# Patient Record
Sex: FEMALE | Race: WHITE | NOT HISPANIC OR LATINO | Employment: FULL TIME | URBAN - METROPOLITAN AREA
[De-identification: names, ages, dates, MRNs, and addresses within clinical notes are randomized per-mention and may not be internally consistent; named-entity substitution may affect disease eponyms.]

---

## 2017-01-06 ENCOUNTER — RADIANT APPOINTMENT (OUTPATIENT)
Dept: GENERAL RADIOLOGY | Facility: CLINIC | Age: 37
End: 2017-01-06
Attending: PHYSICIAN ASSISTANT
Payer: COMMERCIAL

## 2017-01-06 ENCOUNTER — OFFICE VISIT (OUTPATIENT)
Dept: PEDIATRICS | Facility: CLINIC | Age: 37
End: 2017-01-06
Payer: COMMERCIAL

## 2017-01-06 VITALS
HEART RATE: 90 BPM | DIASTOLIC BLOOD PRESSURE: 70 MMHG | TEMPERATURE: 98.8 F | WEIGHT: 214.8 LBS | BODY MASS INDEX: 39.53 KG/M2 | OXYGEN SATURATION: 100 % | SYSTOLIC BLOOD PRESSURE: 98 MMHG | HEIGHT: 62 IN

## 2017-01-06 DIAGNOSIS — J02.9 ACUTE PHARYNGITIS, UNSPECIFIED ETIOLOGY: ICD-10-CM

## 2017-01-06 DIAGNOSIS — J45.901 EXACERBATION OF ASTHMA: Primary | ICD-10-CM

## 2017-01-06 DIAGNOSIS — R05.9 COUGH: ICD-10-CM

## 2017-01-06 LAB
DEPRECATED S PYO AG THROAT QL EIA: NORMAL
MICRO REPORT STATUS: NORMAL
SPECIMEN SOURCE: NORMAL

## 2017-01-06 PROCEDURE — 99214 OFFICE O/P EST MOD 30 MIN: CPT | Performed by: PHYSICIAN ASSISTANT

## 2017-01-06 PROCEDURE — 87081 CULTURE SCREEN ONLY: CPT | Performed by: PHYSICIAN ASSISTANT

## 2017-01-06 PROCEDURE — 71020 XR CHEST 2 VW: CPT

## 2017-01-06 PROCEDURE — 87880 STREP A ASSAY W/OPTIC: CPT | Performed by: PHYSICIAN ASSISTANT

## 2017-01-06 RX ORDER — LORATADINE 10 MG/1
10 TABLET ORAL DAILY
Qty: 30 TABLET | Refills: 1 | COMMUNITY
Start: 2017-01-06 | End: 2023-01-06

## 2017-01-06 RX ORDER — ALBUTEROL SULFATE 90 UG/1
2 AEROSOL, METERED RESPIRATORY (INHALATION) EVERY 6 HOURS PRN
Qty: 1 INHALER | Refills: 0 | Status: SHIPPED | OUTPATIENT
Start: 2017-01-06 | End: 2017-04-28

## 2017-01-06 NOTE — PATIENT INSTRUCTIONS
Call with worsening symptoms                      Acute Bronchitis                  What is acute bronchitis?   Bronchitis is an infection of the air passages--that is, the tubes that connect the windpipe to the lungs. It causes swelling and irritation of the airways. With acute bronchitis you usually have a cough that produces phlegm and pain behind the breastbone when you breathe deeply or cough.   How does it occur?   Bronchitis often occurs with viral infections of the respiratory tract, such as colds and flu. Bronchitis may also be caused by bacterial infections. It may occur with childhood illnesses such as measles and whooping cough.   Attacks are most frequent during the winter or when the level of air pollution is high.   Infants, young children, older adults, smokers, and people with heart disease or lung disease (including asthma and allergies) are most likely to get acute bronchitis.   What are the symptoms?   Symptoms may include:   a deep cough that produces yellowish or greenish phlegm   pain behind the breastbone when you breathe deeply or cough   wheezing   feeling short of breath   fever   chills   headache   sore muscles.   How is it diagnosed?   Your healthcare provider will ask about your symptoms and examine you. You may have tests, such as:   a test of phlegm to look for bacteria   chest X-ray   blood tests.   How is it treated?   Acute bronchitis often does not require medical treatment. Resting at home and drinking plenty of fluids to keep the mucus loose may be all you need to do to get better in a few days. If your symptoms are severe or you have other health problems (such as heart or lung disease or diabetes), you may need to take antibiotics.   How long will the effects last?   Most of the time acute bronchitis clears up in a few days. Your cough may slowly get better in 1 to 2 weeks.   It may take you longer to recover if:   You are a smoker.   You live in an area where air pollution  is a problem.   You have a heart or lung disease.   You have any other continuing health problems.   How can I take care of myself?   You can help yourself by:   following the full treatment your healthcare provider recommends   using a vaporizer, humidifier, or steam from hot water to add moisture to the air   drinking plenty of liquids   taking cough medicine if recommended by your healthcare provider   resting in bed   taking aspirin or acetaminophen to reduce fever and relieve headache and muscle pain (Check with your healthcare provider before you give any medicine that contains aspirin or salicylates to a child or teen. This includes medicines like baby aspirin, some cold medicines, and Pepto Bismol. Children and teens who take aspirin are at risk for a serious illness called Reye's syndrome.)   eating healthy meals.   Call your healthcare provider if:   You have trouble breathing.   You have a fever of 101.5?F (38.6?C) or higher.   You cough up blood.   Your symptoms are getting worse instead of better.   You don't start to feel better after 3 days of treatment.   You have any symptoms that concern you.   How can I help prevent acute bronchitis?   To reduce your risk of getting a respiratory infection:   Do not smoke.   Wash your hands often, especially when you are around people with colds (upper respiratory infections).   If you have asthma or allergies, keep your symptoms under good control.   Get regular exercise.   Eat healthy foods.       Published by [employer].  This content is reviewed periodically and is subject to change as new health information becomes available. The information is intended to inform and educate and is not a replacement for medical evaluation, advice, diagnosis or treatment by a healthcare professional.   Developed by [employer].   ? 2010 [employer] and/or its affiliates. All Rights Reserved.   Copyright   [employer] 2011

## 2017-01-06 NOTE — PROGRESS NOTES
"  SUBJECTIVE:                                                    Micheline Camargo is a 36 year old female who presents to clinic today for the following health issues:    Acute Illness   Acute illness concerns: cough  Onset: 10 days ago     Fever: YES- 100    Chills/Sweats: YES    Headache (location?): YES    Sinus Pressure:YES    Conjunctivitis:  no    Ear Pain: YES- pressure    Rhinorrhea: YES- in the middle    Congestion: YES- in the middle    Sore Throat: YES     Cough: YES-productive of clear sputum, productive of yellow sputum    Wheeze: YES    Worse at night    Decreased Appetite: YES    Nausea: YES    Vomiting: no    Diarrhea:  no    Dysuria/Freq.: no    Fatigue/Achiness: YES    Sick/Strep Exposure: YES     Therapies Tried and outcome: nyquil, claritin, sudafed  History of asthma, allergies.   Nonsmoker.   Work: office  Problem list, Medication list, Allergies, and Medical/Social/Surgical histories reviewed in [facility] and updated as appropriate.    ROS:  ROS otherwise negative    OBJECTIVE:                                                    BP 98/70 mmHg  Pulse 90  Temp(Src) 98.8  F (37.1  C) (Oral)  Ht 5' 1.5\" (1.562 m)  Wt 214 lb 12.8 oz (97.433 kg)  BMI 39.93 kg/m2  SpO2 100%  Body mass index is 39.93 kg/(m^2).   GENERAL: alert, no distress  HENT: ear canals- normal; TMs- normal; Nose- normal; Mouth- no ulcers, no lesions; no sinus tenderness   NECK: no tenderness, no adenopathy  RESP:diminished breath sounds; prolonged expirations - no rales, no rhonchi, no wheezes  CV: regular rates and rhythm, normal S1 S2, no S3 or S4 and no murmur, no click or rub    Diagnostic test results:  Results for orders placed or performed in visit on 01/06/17 (from the past 24 hour(s))   Strep, Rapid Screen   Result Value Ref Range    Specimen Description Throat     Rapid Strep A Screen       NEGATIVE: No Group A streptococcal antigen detected by immunoassay, await   culture report.      Micro Report Status FINAL " 01/06/2017         ASSESSMENT/PLAN:                                                    (J45.901) Exacerbation of asthma  (primary encounter diagnosis)  Comment: begin albuterol up to four times daily PRN. Call if symptoms persist or worsen.  Plan: XR Chest 2 Views, albuterol (PROAIR         HFA/PROVENTIL HFA/VENTOLIN HFA) 108 (90 BASE)         MCG/ACT Inhaler            (J02.9) Acute pharyngitis, unspecified etiology  Comment:   Plan: Strep, Rapid Screen            See Patient Instructions    Colt Loaiza PA-C  [facility]

## 2017-01-06 NOTE — NURSING NOTE
"Chief Complaint   Patient presents with     URI       Initial BP 98/70 mmHg  Pulse 90  Temp(Src) 98.8  F (37.1  C) (Oral)  Ht 5' 1.5\" (1.562 m)  Wt 214 lb 12.8 oz (97.433 kg)  BMI 39.93 kg/m2  SpO2 100% Estimated body mass index is 39.93 kg/(m^2) as calculated from the following:    Height as of this encounter: 5' 1.5\" (1.562 m).    Weight as of this encounter: 214 lb 12.8 oz (97.433 kg).  BP completed using cuff size: regular    "

## 2017-01-06 NOTE — MR AVS SNAPSHOT
After Visit Summary   1/6/2017    Micheline Camargo    MRN: [id number]           Patient Information     Date Of Birth          1980        Visit Information        Provider Department      1/6/2017 3:10 PM Colt Loaiza PA-C [facility]        Today's Diagnoses     Exacerbation of asthma    -  1     Acute pharyngitis, unspecified etiology           Care Instructions    Call with worsening symptoms                      Acute Bronchitis                  What is acute bronchitis?   Bronchitis is an infection of the air passages--that is, the tubes that connect the windpipe to the lungs. It causes swelling and irritation of the airways. With acute bronchitis you usually have a cough that produces phlegm and pain behind the breastbone when you breathe deeply or cough.   How does it occur?   Bronchitis often occurs with viral infections of the respiratory tract, such as colds and flu. Bronchitis may also be caused by bacterial infections. It may occur with childhood illnesses such as measles and whooping cough.   Attacks are most frequent during the winter or when the level of air pollution is high.   Infants, young children, older adults, smokers, and people with heart disease or lung disease (including asthma and allergies) are most likely to get acute bronchitis.   What are the symptoms?   Symptoms may include:   a deep cough that produces yellowish or greenish phlegm   pain behind the breastbone when you breathe deeply or cough   wheezing   feeling short of breath   fever   chills   headache   sore muscles.   How is it diagnosed?   Your healthcare provider will ask about your symptoms and examine you. You may have tests, such as:   a test of phlegm to look for bacteria   chest X-ray   blood tests.   How is it treated?   Acute bronchitis often does not require medical treatment. Resting at home and drinking plenty of fluids to keep the mucus loose may be all you need to do to  get better in a few days. If your symptoms are severe or you have other health problems (such as heart or lung disease or diabetes), you may need to take antibiotics.   How long will the effects last?   Most of the time acute bronchitis clears up in a few days. Your cough may slowly get better in 1 to 2 weeks.   It may take you longer to recover if:   You are a smoker.   You live in an area where air pollution is a problem.   You have a heart or lung disease.   You have any other continuing health problems.   How can I take care of myself?   You can help yourself by:   following the full treatment your healthcare provider recommends   using a vaporizer, humidifier, or steam from hot water to add moisture to the air   drinking plenty of liquids   taking cough medicine if recommended by your healthcare provider   resting in bed   taking aspirin or acetaminophen to reduce fever and relieve headache and muscle pain (Check with your healthcare provider before you give any medicine that contains aspirin or salicylates to a child or teen. This includes medicines like baby aspirin, some cold medicines, and Pepto Bismol. Children and teens who take aspirin are at risk for a serious illness called Reye's syndrome.)   eating healthy meals.   Call your healthcare provider if:   You have trouble breathing.   You have a fever of 101.5?F (38.6?C) or higher.   You cough up blood.   Your symptoms are getting worse instead of better.   You don't start to feel better after 3 days of treatment.   You have any symptoms that concern you.   How can I help prevent acute bronchitis?   To reduce your risk of getting a respiratory infection:   Do not smoke.   Wash your hands often, especially when you are around people with colds (upper respiratory infections).   If you have asthma or allergies, keep your symptoms under good control.   Get regular exercise.   Eat healthy foods.       Published by [employer].  This content is reviewed  "periodically and is subject to change as new health information becomes available. The information is intended to inform and educate and is not a replacement for medical evaluation, advice, diagnosis or treatment by a healthcare professional.   Developed by [employer].   ? 2010 [employer] and/or its affiliates. All Rights Reserved.   Copyright   Clinical [employer] 2011                Follow-ups after your visit        Who to contact     If you have questions or need follow up information about today's clinic visit or your schedule please contact [facility] directly at [phone].  Normal or non-critical lab and imaging results will be communicated to you by [employer], letter or phone within 4 business days after the clinic has received the results. If you do not hear from us within 7 days, please contact the clinic through [employer] or phone. If you have a critical or abnormal lab result, we will notify you by phone as soon as possible.  Submit refill requests through rollApp or call your pharmacy and they will forward the refill request to us. Please allow 3 business days for your refill to be completed.          Additional Information About Your Visit        [employer] Information     rollApp gives you secure access to your electronic health record. If you see a primary care provider, you can also send messages to your care team and make appointments. If you have questions, please call your primary care clinic.  If you do not have a primary care provider, please call [phone] and they will assist you.        Care EveryWhere ID     This is your Care EveryWhere ID. This could be used by other organizations to access your [place] medical records  [id number]        Your Vitals Were     Pulse Temperature Height BMI (Body Mass Index) Pulse Oximetry       90 98.8  F (37.1  C) (Oral) 5' 1.5\" (1.562 m) 39.93 kg/m2 100%        Blood Pressure from Last 3 Encounters:   01/06/17 98/70   12/21/15 118/80 "   06/17/15 100/76    Weight from Last 3 Encounters:   01/06/17 214 lb 12.8 oz (97.433 kg)   12/21/15 212 lb 1.6 oz (96.208 kg)   06/17/15 214 lb (97.07 kg)              We Performed the Following     Strep, Rapid Screen          Today's Medication Changes          These changes are accurate as of: 1/6/17  4:08 PM.  If you have any questions, ask your nurse or doctor.               Start taking these medicines.        Dose/Directions    albuterol 108 (90 BASE) MCG/ACT Inhaler   Commonly known as:  PROAIR HFA/PROVENTIL HFA/VENTOLIN HFA   Used for:  Exacerbation of asthma   Started by:  Colt Loaiza PA-C        Dose:  2 puff   Inhale 2 puffs into the lungs every 6 hours as needed for shortness of breath / dyspnea or wheezing   Quantity:  1 Inhaler   Refills:  0         Stop taking these medicines if you haven't already. Please contact your care team if you have questions.     predniSONE 20 MG tablet   Commonly known as:  DELTASONE   Stopped by:  Colt Loaiza PA-C           PseudoePHEDrine-guaiFENesin 120-1200 MG Tb12   Commonly known as:  MUCINEX D   Stopped by:  Colt Loaiza PA-C                Where to get your medicines      These medications were sent to [facility] - [address]   [address]  [address], [place] [address]     Phone:  [phone]    - albuterol 108 (90 BASE) MCG/ACT Inhaler             Primary Care Provider Office Phone # Fax #    Alex Mantilla [phone]       [facility] [address] [place] [address] [address] [place] [address]        Thank you!     Thank you for choosing [facility]  for your care. Our goal is always to provide you with excellent care. Hearing back from our patients is one way we can continue to improve our services. Please take a few minutes to complete the written survey that you may receive in the mail after your visit with us. Thank you!             Your Updated  Medication List - Protect others around you: Learn how to safely use, store and throw away your medicines at www.disposemymeds.org.          This list is accurate as of: 1/6/17  4:08 PM.  Always use your most recent med list.                   Brand Name Dispense Instructions for use    albuterol 108 (90 BASE) MCG/ACT Inhaler    PROAIR HFA/PROVENTIL HFA/VENTOLIN HFA    1 Inhaler    Inhale 2 puffs into the lungs every 6 hours as needed for shortness of breath / dyspnea or wheezing       CLARITIN 10 MG tablet   Generic drug:  loratadine     30 tablet    Take 1 tablet (10 mg) by mouth daily

## 2017-01-08 LAB
BACTERIA SPEC CULT: NORMAL
MICRO REPORT STATUS: NORMAL
SPECIMEN SOURCE: NORMAL

## 2017-02-27 PROBLEM — J45.901 EXACERBATION OF ASTHMA: Status: ACTIVE | Noted: 2017-02-27

## 2017-04-28 ENCOUNTER — OFFICE VISIT (OUTPATIENT)
Dept: PEDIATRICS | Facility: CLINIC | Age: 37
End: 2017-04-28
Payer: COMMERCIAL

## 2017-04-28 VITALS
SYSTOLIC BLOOD PRESSURE: 105 MMHG | DIASTOLIC BLOOD PRESSURE: 63 MMHG | TEMPERATURE: 97.7 F | BODY MASS INDEX: 38.03 KG/M2 | WEIGHT: 204.6 LBS | HEART RATE: 83 BPM

## 2017-04-28 DIAGNOSIS — Z97.5 IUD (INTRAUTERINE DEVICE) IN PLACE: ICD-10-CM

## 2017-04-28 DIAGNOSIS — J45.901 EXACERBATION OF ASTHMA: ICD-10-CM

## 2017-04-28 DIAGNOSIS — Z00.00 ROUTINE GENERAL MEDICAL EXAMINATION AT A HEALTH CARE FACILITY: Primary | ICD-10-CM

## 2017-04-28 DIAGNOSIS — D23.5 BENIGN NEOPLASM OF SKIN OF TRUNK, EXCEPT SCROTUM: ICD-10-CM

## 2017-04-28 DIAGNOSIS — Z71.1 CONCERN ABOUT FEMALE BREAST DISEASE WITHOUT DIAGNOSIS: ICD-10-CM

## 2017-04-28 PROCEDURE — 99395 PREV VISIT EST AGE 18-39: CPT | Performed by: NURSE PRACTITIONER

## 2017-04-28 PROCEDURE — G0145 SCR C/V CYTO,THINLAYER,RESCR: HCPCS | Performed by: NURSE PRACTITIONER

## 2017-04-28 PROCEDURE — 87624 HPV HI-RISK TYP POOLED RSLT: CPT | Performed by: NURSE PRACTITIONER

## 2017-04-28 RX ORDER — ALBUTEROL SULFATE 90 UG/1
2 AEROSOL, METERED RESPIRATORY (INHALATION) EVERY 6 HOURS PRN
Qty: 1 INHALER | Refills: 3 | Status: SHIPPED | OUTPATIENT
Start: 2017-04-28 | End: 2019-03-15

## 2017-04-28 NOTE — PROGRESS NOTES
SUBJECTIVE:     CC: Micheline Camargo is an 36 year old woman who presents for preventive health visit.     Physical   Annual:     Getting at least 3 servings of Calcium per day::  Yes    Bi-annual eye exam::  Yes    Dental care twice a year::  Yes    Sleep apnea or symptoms of sleep apnea::  None    Diet::  Regular (no restrictions) and Carbohydrate counting    Frequency of exercise::  4-5 days/week    Duration of exercise::  30-45 minutes    Taking medications regularly::  Yes    Medication side effects::  None    Additional concerns today::  YES    Had Mirena IUD placed approx 2 years ago. Normally has light bleeding about every 4 months, but about 2 weeks ago she had a few days of spotting. She has since stopped bleeding.Feels like she can't feel her IUD strings. Denies pain. She is sexually active, but has had scant bleeding.     Has concerns about mild breast tenderness, especially on the right. This pain fluctuates throughout the month. Had breast US done at outside facility about 5 years ago that showed dense breast tissue. No fam hx of breast cancer.    Fair skin and multiple moles. Concerned about darker colored mole to right anterior shin. She has skin checks every 6 months per derm.    Today's PHQ-2 Score:   PHQ-2 ( 1999 Pfizer) 4/28/2017   Q1: Little interest or pleasure in doing things -   Q2: Feeling down, depressed or hopeless -   PHQ-2 Score -   Little interest or pleasure in doing things Not at all   Feeling down, depressed or hopeless Not at all   PHQ-2 Score 0     Abuse: Current or Past(Physical, Sexual or Emotional)- No  Do you feel safe in your environment - Yes    Social History   Substance Use Topics     Smoking status: Never Smoker     Smokeless tobacco: Never Used     Alcohol use 1.0 oz/week     2 Glasses of wine per week     The patient does not drink >3 drinks per day nor >7 drinks per week.    Recent Labs   Lab Test  06/25/14   0816  08/10/11   1004   CHOL  125  139   HDL  43*  44*    LDL  67  76   TRIG  79  93   CHOLHDLRATIO  2.9  3.2       Reviewed orders with patient.  Reviewed health maintenance and updated orders accordingly - Yes    Mammo Decision Support:  Mammo discussed, not appropriate for or declined by this patient.    Pertinent mammograms are reviewed under the imaging tab.  History of abnormal Pap smear: NO - age 30-65 PAP every 5 years with negative HPV co-testing recommended    Reviewed and updated as needed this visit by clinical staff  Tobacco  Med Hx  Surg Hx  Fam Hx  Soc Hx      Reviewed and updated as needed this visit by Provider        ROS:  C: NEGATIVE for fever, chills, change in weight  INTEGUMENTARY/SKIN: POSITIVE for multiple moles and fair skin.  E: NEGATIVE for vision changes or irritation  ENT: NEGATIVE for ear, mouth and throat problems  R: NEGATIVE for significant cough or SOB  BREAST: POSITIVE for Hx fibrocystic changes and intermittent right breast pain, NEGATIVE for breast skin changes, nipple pain, or nipple discharge.  CV: NEGATIVE for chest pain, palpitations or peripheral edema  GI: NEGATIVE for nausea, abdominal pain, heartburn, or change in bowel habits   female: POSITIVE for light spotting with IUD x1, denies menstrual cramps or abdominal pain. See above.  M: NEGATIVE for significant arthralgias or myalgia  N: NEGATIVE for weakness, dizziness or paresthesias  P: NEGATIVE for changes in mood or affect  Problem list, Medication list, Allergies, and Medical/Social/Surgical histories reviewed in EPIC and updated as appropriate.    OBJECTIVE:     /63  Pulse 83  Temp 97.7  F (36.5  C) (Tympanic)  Wt 204 lb 9.6 oz (92.8 kg)  BMI 38.03 kg/m2  EXAM:  GENERAL: healthy, alert and no distress  EYES: Eyes grossly normal to inspection, PERRL and conjunctivae and sclerae normal  HENT: ear canals and TM's normal, nose and mouth without ulcers or lesions  NECK: no adenopathy, no asymmetry, masses, or scars and thyroid normal to palpation  RESP: lungs  clear to auscultation - no rales, rhonchi or wheezes  BREAST: normal without masses,  or nipple discharge and no palpable axillary masses or adenopathy. Mild tenderness under right axilla.  CV: regular rate and rhythm, normal S1 S2, no S3 or S4, no murmur, click or rub, no peripheral edema and peripheral pulses strong  ABDOMEN: soft, nontender, no hepatosplenomegaly, no masses and bowel sounds normal   (female): normal female external genitalia, normal urethral meatus, vaginal mucosa pink, moist, well rugated, and normal cervix/adnexa/uterus without masses or discharge. IUD strings short but visualized from os after minimal manipulation with cervical brush.  MS: no gross musculoskeletal defects noted, no edema  SKIN: no suspicious lesions or rashes, fair skin, multiple moles to upper back, shoulders, arms, and lower legs. X1 pinpoint dark brown lesion to right shin.  NEURO: Normal strength and tone, mentation intact and speech normal  PSYCH: mentation appears normal, affect normal/bright  LYMPH: no cervical, supraclavicular, or axillary adenopathy    ASSESSMENT/PLAN:     1. Routine general medical examination at a health care facility  Pt overweight. Discussed healthy diet and exercise, she plans to exercise more this summer and follow low carb diet. She has had normal lipid panel done in the past 1 year.  - Asthma Action Plan (AAP)  - Pap imaged thin layer screen with HPV - recommended age 30 - 65 years (select HPV order below)  - HPV High Risk Types DNA Cervical    2. Exacerbation of asthma  Well controlled at this time.  - Asthma Action Plan (AAP)  - albuterol (PROAIR HFA/PROVENTIL HFA/VENTOLIN HFA) 108 (90 BASE) MCG/ACT Inhaler; Inhale 2 puffs into the lungs every 6 hours as needed for shortness of breath / dyspnea or wheezing  Dispense: 1 Inhaler; Refill: 3    3. IUD (intrauterine device) in place  She was concerned about IUD placement. Able to visualize strings after minimal manipulation with cervical brush,  "strings appear short. No concern for displacement or perforation and no indication for US at this time. Pt comfortable with this plan.    4. Benign neoplasm of skin of trunk, except scrotum  Pt has multiple moles to body and fair skin. Continue with every 6 month mole checks per derm. Her area of concern to her right anterior shin appears to be benign on exam.    5. Concern about female breast disease without diagnosis  Pt reports hx of fibrocystic breast changes on US done at an outside facility. Breast exam today normal. No fam hx of breast cancer. Discussed recommendations for mammograms and breast imaging and she feels comfortable waiting until age 40 to start mammograms since no increased risk. Discussed that fibrocystic breast changes do not put her at increased risk for breast cancer.      COUNSELING:  Reviewed preventive health counseling, as reflected in patient instructions       Regular exercise       Healthy diet/nutrition       Contraception       Safe sex practices/STD prevention     reports that she has never smoked. She has never used smokeless tobacco.    Estimated body mass index is 38.03 kg/(m^2) as calculated from the following:    Height as of 1/6/17: 5' 1.5\" (1.562 m).    Weight as of this encounter: 204 lb 9.6 oz (92.8 kg).   Weight management plan: Discussed healthy diet and exercise guidelines and patient will follow up in 12 months in clinic to re-evaluate.    Counseling Resources:  ATP IV Guidelines  Pooled Cohorts Equation Calculator  Breast Cancer Risk Calculator  FRAX Risk Assessment  ICSI Preventive Guidelines  Dietary Guidelines for Americans, 2010  USDA's MyPlate  ASA Prophylaxis  Lung CA Screening    Ceci Alexander, LORI Romero [facility]  "

## 2017-04-28 NOTE — NURSING NOTE
"Chief Complaint   Patient presents with     Physical       Initial /63  Pulse 83  Temp 97.7  F (36.5  C) (Tympanic)  Wt 204 lb 9.6 oz (92.8 kg)  BMI 38.03 kg/m2 Estimated body mass index is 38.03 kg/(m^2) as calculated from the following:    Height as of 1/6/17: 5' 1.5\" (1.562 m).    Weight as of this encounter: 204 lb 9.6 oz (92.8 kg).  Medication Reconciliation: complete    "

## 2017-04-28 NOTE — LETTER
My Asthma Action Plan  Name: Micheline Camargo   YOB: 1980  Date: 4/28/2017   My doctor: LORI Perez CNP   My clinic: [facility]        My Control Medicine: na  My Rescue Medicine: Albuterol (Proair/Ventolin/Proventil) inhaler 1-2 puffs every 4-6hrs as needed   My Asthma Severity: intermittent  Avoid your asthma triggers: smoke, upper respiratory infections, dust mites, pollens, animal dander, insects/rodents, mold, humidity, aspirin, strong odors and fumes, occupational exposure, exercise or sports, emotions, cold air, Gastric Reflux and Patient is unaware of triggers               GREEN ZONE     Good Control    I feel good    No cough or wheeze    Can work, sleep and play without asthma symptoms       Take your asthma control medicine every day.     1. If exercise triggers your asthma, take your rescue medication    15 minutes before exercise or sports, and    During exercise if you have asthma symptoms  2. Spacer to use with inhaler: If you have a spacer, make sure to use it with your inhaler             YELLOW ZONE     Getting Worse  I have ANY of these:    I do not feel good    Cough or wheeze    Chest feels tight    Wake up at night   1. Keep taking your Green Zone medications  2. Start taking your rescue medicine:    every 20 minutes for up to 1 hour. Then every 4 hours for 24-48 hours.  3. If you stay in the Yellow Zone for more than 12-24 hours, contact your doctor.  4. If you do not return to the Green Zone in 12-24 hours or you get worse, start taking your oral steroid medicine if prescribed by your provider.           RED ZONE     Medical Alert - Get Help  I have ANY of these:    I feel awful    Medicine is not helping    Breathing getting harder    Trouble walking or talking    Nose opens wide to breathe       1. Take your rescue medicine NOW  2. If your provider has prescribed an oral steroid medicine, start taking it NOW  3. Call your doctor NOW  4. If you  are still in the Red Zone after 20 minutes and you have not reached your doctor:    Take your rescue medicine again and    Call 911 or go to the emergency room right away    See your regular doctor within 2 weeks of an Emergency Room or Urgent Care visit for follow-up treatment.        Electronically signed by: Judith Ramsey, April 28, 2017    Annual Reminders:  Meet with Asthma Educator,  Flu Shot in the Fall, consider Pneumonia Vaccination for patients with asthma (aged 19 and older).    Pharmacy:    [facility] [facility], [place] - [address] [facility] [address] [facility] [facility] [address] [facility] [address] [place] [facility] [address]  [address]  [id number] [place] - [address]                    Asthma Triggers  How To Control Things That Make Your Asthma Worse    Triggers are things that make your asthma worse.  Look at the list below to help you find your triggers and what you can do about them.  You can help prevent asthma flare-ups by staying away from your triggers.      Trigger                                                          What you can do   Cigarette Smoke  Tobacco smoke can make asthma worse. Do not allow smoking in your home, car or around you.  Be sure no one smokes at a child s day care or school.  If you smoke, ask your health care provider for ways to help you quit.  Ask family members to quit too.  Ask your health care provider for a referral to Quit Plan to help you quit smoking, or call 6-155-410-PLAN.     Colds, Flu, Bronchitis  These are common triggers of asthma. Wash your hands often.  Don t touch your eyes, nose or mouth.  Get a flu shot every year.     Dust Mites  These are tiny bugs that live in cloth or carpet. They are too small to see. Wash sheets and blankets in hot water every week.   Encase pillows and mattress in dust mite proof covers.  Avoid having carpet if you can. If you have carpet, vacuum weekly.   Use a dust mask and HEPA  vacuum.   Pollen and Outdoor Mold  Some people are allergic to trees, grass, or weed pollen, or molds. Try to keep your windows closed.  Limit time out doors when pollen count is high.   Ask you health care provider about taking medicine during allergy season.     Animal Dander  Some people are allergic to skin flakes, urine or saliva from pets with fur or feathers. Keep pets with fur or feathers out of your home.    If you can t keep the pet outdoors, then keep the pet out of your bedroom.  Keep the bedroom door closed.  Keep pets off cloth furniture and away from stuffed toys.     Mice, Rats, and Cockroaches  Some people are allergic to the waste from these pests.   Cover food and garbage.  Clean up spills and food crumbs.  Store grease in the refrigerator.   Keep food out of the bedroom.   Indoor Mold  This can be a trigger if your home has high moisture. Fix leaking faucets, pipes, or other sources of water.   Clean moldy surfaces.  Dehumidify basement if it is damp and smelly.   Smoke, Strong Odors, and Sprays  These can reduce air quality. Stay away from strong odors and sprays, such as perfume, powder, hair spray, paints, smoke incense, paint, cleaning products, candles and new carpet.   Exercise or Sports  Some people with asthma have this trigger. Be active!  Ask your doctor about taking medicine before sports or exercise to prevent symptoms.    Warm up for 5-10 minutes before and after sports or exercise.     Other Triggers of Asthma  Cold air:  Cover your nose and mouth with a scarf.  Sometimes laughing or crying can be a trigger.  Some medicines and food can trigger asthma.

## 2017-04-28 NOTE — MR AVS SNAPSHOT
After Visit Summary   4/28/2017    Micheline Camargo    MRN: [id number]           Patient Information     Date Of Birth          1980        Visit Information        Provider Department      4/28/2017 9:00 AM Mariposa Butcher APRN [facility]        Today's Diagnoses     Routine general medical examination at a health care facility    -  1    Exacerbation of asthma        IUD (intrauterine device) in place          Care Instructions      Preventive Health Recommendations  Female Ages 26 - 39  Yearly exam:   See your health care provider every year in order to    Review health changes.     Discuss preventive care.      Review your medicines if you your doctor has prescribed any.    Until age 30: Get a Pap test every three years (more often if you have had an abnormal result).    After age 30: Talk to your doctor about whether you should have a Pap test every 3 years or have a Pap test with HPV screening every 5 years.   You do not need a Pap test if your uterus was removed (hysterectomy) and you have not had cancer.  You should be tested each year for STDs (sexually transmitted diseases), if you're at risk.   Talk to your provider about how often to have your cholesterol checked.  If you are at risk for diabetes, you should have a diabetes test (fasting glucose).  Shots: Get a flu shot each year. Get a tetanus shot every 10 years.   Nutrition:     Eat at least 5 servings of fruits and vegetables each day.    Eat whole-grain bread, whole-wheat pasta and brown rice instead of white grains and rice.    Talk to your provider about Calcium and Vitamin D.     Lifestyle    Exercise at least 150 minutes a week (30 minutes a day, 5 days of the week). This will help you control your weight and prevent disease.    Limit alcohol to one drink per day.    No smoking.     Wear sunscreen to prevent skin cancer.    See your dentist every six months for an exam and cleaning.             Follow-ups after your visit        Follow-up notes from your care team     Return in about 1 year (around 4/28/2018).      Who to contact     If you have questions or need follow up information about today's clinic visit or your schedule please contact [facility] directly at [phone].  Normal or non-critical lab and imaging results will be communicated to you by MyChart, letter or phone within 4 business days after the clinic has received the results. If you do not hear from us within 7 days, please contact the clinic through [employer] or phone. If you have a critical or abnormal lab result, we will notify you by phone as soon as possible.  Submit refill requests through [employer] or call your pharmacy and they will forward the refill request to us. Please allow 3 business days for your refill to be completed.          Additional Information About Your Visit        MyChart Information     [employer] gives you secure access to your electronic health record. If you see a primary care provider, you can also send messages to your care team and make appointments. If you have questions, please call your primary care clinic.  If you do not have a primary care provider, please call [phone] and they will assist you.        Care EveryWhere ID     This is your Care EveryWhere ID. This could be used by other organizations to access your [facility] medical records  [id number]        Your Vitals Were     Pulse Temperature BMI (Body Mass Index)             83 97.7  F (36.5  C) (Tympanic) 38.03 kg/m2          Blood Pressure from Last 3 Encounters:   04/28/17 105/63   01/06/17 98/70   12/21/15 118/80    Weight from Last 3 Encounters:   04/28/17 204 lb 9.6 oz (92.8 kg)   01/06/17 214 lb 12.8 oz (97.4 kg)   12/21/15 212 lb 1.6 oz (96.2 kg)              We Performed the Following     Asthma Action Plan (AAP)        Primary Care Provider Office Phone # Fax #    Alex Mantilla [phone]       [facility] [address] [place] [address]        Thank you!     [facility]. Our goal is always to provide you with excellent care. Hearing back from our patients is one way we can continue to improve our services. Please take a few minutes to complete the written survey that you may receive in the mail after your visit with us. Thank you!             Your Updated Medication List - Protect others around you: Learn how to safely use, store and throw away your medicines at www.disposemymeds.org.          This list is accurate as of: 4/28/17  9:42 AM.  Always use your most recent med list.                   Brand Name Dispense Instructions for use    albuterol 108 (90 BASE) MCG/ACT Inhaler    PROAIR HFA/PROVENTIL HFA/VENTOLIN HFA    1 Inhaler    Inhale 2 puffs into the lungs every 6 hours as needed for shortness of breath / dyspnea or wheezing       CLARITIN 10 MG tablet   Generic drug:  loratadine     30 tablet    Take 1 tablet (10 mg) by mouth daily

## 2017-04-29 ASSESSMENT — ASTHMA QUESTIONNAIRES: ACT_TOTALSCORE: 24

## 2017-05-02 LAB
COPATH REPORT: NORMAL
PAP: NORMAL

## 2017-05-04 LAB
FINAL DIAGNOSIS: NORMAL
HPV HR 12 DNA CVX QL NAA+PROBE: NEGATIVE
HPV16 DNA SPEC QL NAA+PROBE: NEGATIVE
HPV18 DNA SPEC QL NAA+PROBE: NEGATIVE
SPECIMEN DESCRIPTION: NORMAL

## 2018-11-20 ENCOUNTER — HOSPITAL ENCOUNTER (EMERGENCY)
Facility: CLINIC | Age: 38
Discharge: HOME | End: 2018-11-20
Attending: EMERGENCY MEDICINE | Admitting: EMERGENCY MEDICINE
Payer: COMMERCIAL

## 2018-11-20 ENCOUNTER — APPOINTMENT (OUTPATIENT)
Dept: GENERAL RADIOLOGY | Facility: CLINIC | Age: 38
End: 2018-11-20
Attending: EMERGENCY MEDICINE
Payer: COMMERCIAL

## 2018-11-20 VITALS
TEMPERATURE: 98.8 F | OXYGEN SATURATION: 97 % | WEIGHT: 216 LBS | BODY MASS INDEX: 39.75 KG/M2 | RESPIRATION RATE: 22 BRPM | HEIGHT: 62 IN | SYSTOLIC BLOOD PRESSURE: 112 MMHG | DIASTOLIC BLOOD PRESSURE: 74 MMHG

## 2018-11-20 DIAGNOSIS — R07.9 ACUTE CHEST PAIN: ICD-10-CM

## 2018-11-20 LAB
ANION GAP SERPL CALCULATED.3IONS-SCNC: 9 MMOL/L (ref 3–14)
B-HCG FREE SERPL-ACNC: <5 IU/L
BASOPHILS # BLD AUTO: 0 10E9/L (ref 0–0.2)
BASOPHILS NFR BLD AUTO: 0.4 %
BUN SERPL-MCNC: 16 MG/DL (ref 7–30)
CALCIUM SERPL-MCNC: 8.8 MG/DL (ref 8.5–10.1)
CHLORIDE SERPL-SCNC: 106 MMOL/L (ref 94–109)
CO2 SERPL-SCNC: 24 MMOL/L (ref 20–32)
CREAT SERPL-MCNC: 0.84 MG/DL (ref 0.52–1.04)
DIFFERENTIAL METHOD BLD: NORMAL
EOSINOPHIL # BLD AUTO: 0.3 10E9/L (ref 0–0.7)
EOSINOPHIL NFR BLD AUTO: 2.6 %
ERYTHROCYTE [DISTWIDTH] IN BLOOD BY AUTOMATED COUNT: 12.6 % (ref 10–15)
GFR SERPL CREATININE-BSD FRML MDRD: 75 ML/MIN/1.7M2
GLUCOSE SERPL-MCNC: 101 MG/DL (ref 70–99)
HCT VFR BLD AUTO: 43.2 % (ref 35–47)
HGB BLD-MCNC: 14.4 G/DL (ref 11.7–15.7)
IMM GRANULOCYTES # BLD: 0 10E9/L (ref 0–0.4)
IMM GRANULOCYTES NFR BLD: 0.2 %
LYMPHOCYTES # BLD AUTO: 3.8 10E9/L (ref 0.8–5.3)
LYMPHOCYTES NFR BLD AUTO: 38 %
MCH RBC QN AUTO: 29.5 PG (ref 26.5–33)
MCHC RBC AUTO-ENTMCNC: 33.3 G/DL (ref 31.5–36.5)
MCV RBC AUTO: 89 FL (ref 78–100)
MONOCYTES # BLD AUTO: 0.6 10E9/L (ref 0–1.3)
MONOCYTES NFR BLD AUTO: 6.2 %
NEUTROPHILS # BLD AUTO: 5.3 10E9/L (ref 1.6–8.3)
NEUTROPHILS NFR BLD AUTO: 52.6 %
NRBC # BLD AUTO: 0 10*3/UL
NRBC BLD AUTO-RTO: 0 /100
PLATELET # BLD AUTO: 360 10E9/L (ref 150–450)
POTASSIUM SERPL-SCNC: 3.7 MMOL/L (ref 3.4–5.3)
RBC # BLD AUTO: 4.88 10E12/L (ref 3.8–5.2)
SODIUM SERPL-SCNC: 139 MMOL/L (ref 133–144)
TROPONIN I SERPL-MCNC: <0.015 UG/L (ref 0–0.04)
TROPONIN I SERPL-MCNC: <0.015 UG/L (ref 0–0.04)
WBC # BLD AUTO: 10 10E9/L (ref 4–11)

## 2018-11-20 PROCEDURE — 25000132 ZZH RX MED GY IP 250 OP 250 PS 637: Performed by: EMERGENCY MEDICINE

## 2018-11-20 PROCEDURE — 84484 ASSAY OF TROPONIN QUANT: CPT | Mod: 91 | Performed by: EMERGENCY MEDICINE

## 2018-11-20 PROCEDURE — 80048 BASIC METABOLIC PNL TOTAL CA: CPT | Performed by: EMERGENCY MEDICINE

## 2018-11-20 PROCEDURE — 93005 ELECTROCARDIOGRAM TRACING: CPT

## 2018-11-20 PROCEDURE — 84702 CHORIONIC GONADOTROPIN TEST: CPT

## 2018-11-20 PROCEDURE — 99285 EMERGENCY DEPT VISIT HI MDM: CPT

## 2018-11-20 PROCEDURE — 71046 X-RAY EXAM CHEST 2 VIEWS: CPT

## 2018-11-20 PROCEDURE — 85025 COMPLETE CBC W/AUTO DIFF WBC: CPT | Performed by: EMERGENCY MEDICINE

## 2018-11-20 RX ORDER — ASPIRIN 81 MG/1
324 TABLET, CHEWABLE ORAL ONCE
Status: COMPLETED | OUTPATIENT
Start: 2018-11-20 | End: 2018-11-20

## 2018-11-20 RX ADMIN — ASPIRIN 81 MG 324 MG: 81 TABLET ORAL at 18:50

## 2018-11-20 ASSESSMENT — ENCOUNTER SYMPTOMS
BACK PAIN: 0
NECK PAIN: 0
ABDOMINAL PAIN: 0
DIAPHORESIS: 0
COUGH: 0
VOMITING: 0
SHORTNESS OF BREATH: 1
FEVER: 0
NUMBNESS: 0
DIARRHEA: 0
NAUSEA: 0
WEAKNESS: 0

## 2018-11-20 NOTE — ED AVS SNAPSHOT
[facility] Emergency Department    [address] [place] [address]    Phone:  [phone]    Fax:  [phone]                                       Micheline Camargo   MRN: [id number]    Department:  [facility] Emergency [facility]   Date of Visit:  11/20/2018           Patient Information     Date Of Birth          1980        Your diagnoses for this visit were:     Acute chest pain        You were seen by Marlo Xiong MD.      Follow-up Information     Follow up with Alex Mantilla MD In 5 days.    Specialties:  Internal Medicine, Pediatrics    Contact information:    [facility] [place] [address]  [phone]          Discharge Instructions       Discharge Instructions  Chest Pain    You have been seen today for chest pain or discomfort.  At this time, your doctor has found no signs that your chest pain is due to a serious or life-threatening condition, (or you have declined more testing and/or admission to the hospital). However, sometimes there is a serious problem that does not show up right away. Your evaluation today may not be complete and you may need further testing and evaluation.     You need to follow-up with your regular doctor within 3 days.    Return to the Emergency Department if:    Your chest pain changes, gets worse, starts to happen more often, or comes with less activity.    You are short of breath.    You get very weak or tired.    You pass out or faint.    You have any new symptoms, like fever, cough, numb legs, or you cough up blood.    You have anything else that worries you.    Until you follow-up with your regular doctor please do the following:    Take one aspirin daily unless you have an allergy or are told not to by your doctor.    If a stress test appointment has been made, go to the appointment.    If you have questions, contact your regular doctor.    If your doctor today has told you to follow-up with  your regular doctor, it is very important that you make an appointment with your clinic and go to the appointment.  If you do not follow-up with your primary doctor, it may result in missing an important development which could result in permanent injury or disability and/or lasting pain.  If there is any problem keeping your appointment, call your doctor or return to the Emergency Department.    If you were given a prescription for medicine here today, be sure to read all of the information (including the package insert) that comes with your prescription.  This will include important information about the medicine, its side effects, and any warnings that you need to know about.  The pharmacist who fills the prescription can provide more information and answer questions you may have about the medicine.  If you have questions or concerns that the pharmacist cannot address, please call or return to the Emergency Department.       Remember that you can always come back to the Emergency Department if you are not able to see your regular doctor in the amount of time listed above, if you get any new symptoms, or if there is anything that worries you.          24 Hour Appointment Hotline       To make an appointment at any [facility], call [phone] ([phone]). If you don't have a family doctor or clinic, we will help you find one. [facility] clinics are conveniently located to serve the needs of you and your family.          ED Discharge Orders     Exercise Stress Echocardiogram       Administration of IV contrast will be tailored to this examination per the appropriate written protocol listed in the Echocardiography department Protocol Book, or by the supervising Cardiologist. This may result in an order change.    Use of contrast is at the discretion of the supervising Cardiologist.                     Review of your medicines      START taking        Dose / Directions Last dose taken    aspirin 81 MG  tablet   Dose:  81 mg   Quantity:  7 tablet        Take 1 tablet (81 mg) by mouth daily   Refills:  0          Our records show that you are taking the medicines listed below. If these are incorrect, please call your family doctor or clinic.        Dose / Directions Last dose taken    albuterol 108 (90 Base) MCG/ACT inhaler   Commonly known as:  PROAIR HFA/PROVENTIL HFA/VENTOLIN HFA   Dose:  2 puff   Quantity:  1 Inhaler        Inhale 2 puffs into the lungs every 6 hours as needed for shortness of breath / dyspnea or wheezing   Refills:  3        CLARITIN 10 MG tablet   Dose:  10 mg   Quantity:  30 tablet   Generic drug:  loratadine        Take 1 tablet (10 mg) by mouth daily   Refills:  1                Prescriptions were sent or printed at these locations (1 Prescription)                   Other Prescriptions                Printed at Department/Unit printer (1 of 1)         aspirin 81 MG tablet                Procedures and tests performed during your visit     Procedure/Test Number of Times Performed    Basic metabolic panel 1    CBC with platelets differential 1    Cardiac Continuous Monitoring 1    EKG 12 lead 1    ISTAT HCG Quantitative Pregnancy Nursing POCT 1    ISTAT HCG Quantitative Pregnancy POCT 1    Peripheral IV catheter 1    Troponin I 2    XR Chest 2 Views 1      Orders Needing Specimen Collection     None      Pending Results     Date and Time Order Name Status Description    11/20/2018 1822 XR Chest 2 Views In process     11/20/2018 1813 EKG 12 lead Preliminary             Pending Culture Results     No orders found from 11/18/2018 to 11/21/2018.            Pending Results Instructions     If you had any lab results that were not finalized at the time of your Discharge, you can call the ED Lab Result RN at [phone]. You will be contacted by this team for any positive Lab results or changes in treatment. The nurses are available 7 days a week from 10A to 6:30P.  You can leave a message 24 hours  per day and they will return your call.        Test Results From Your Hospital Stay        11/20/2018  6:49 PM      Component Results     Component Value Ref Range & Units Status    WBC 10.0 4.0 - 11.0 10e9/L Final    RBC Count 4.88 3.8 - 5.2 10e12/L Final    Hemoglobin 14.4 11.7 - 15.7 g/dL Final    Hematocrit 43.2 35.0 - 47.0 % Final    MCV 89 78 - 100 fl Final    MCH 29.5 26.5 - 33.0 pg Final    MCHC 33.3 31.5 - 36.5 g/dL Final    RDW 12.6 10.0 - 15.0 % Final    Platelet Count 360 150 - 450 10e9/L Final    Diff Method Automated Method  Final    % Neutrophils 52.6 % Final    % Lymphocytes 38.0 % Final    % Monocytes 6.2 % Final    % Eosinophils 2.6 % Final    % Basophils 0.4 % Final    % Immature Granulocytes 0.2 % Final    Nucleated RBCs 0 0 /100 Final    Absolute Neutrophil 5.3 1.6 - 8.3 10e9/L Final    Absolute Lymphocytes 3.8 0.8 - 5.3 10e9/L Final    Absolute Monocytes 0.6 0.0 - 1.3 10e9/L Final    Absolute Eosinophils 0.3 0.0 - 0.7 10e9/L Final    Absolute Basophils 0.0 0.0 - 0.2 10e9/L Final    Abs Immature Granulocytes 0.0 0 - 0.4 10e9/L Final    Absolute Nucleated RBC 0.0  Final         11/20/2018  7:07 PM      Component Results     Component Value Ref Range & Units Status    Troponin I ES <0.015 0.000 - 0.045 ug/L Final    The 99th percentile for upper reference range is 0.045 ug/L.  Troponin values   in the range of 0.045 - 0.120 ug/L may be associated with risks of adverse   clinical events.           11/20/2018  6:55 PM      Result not yet available     Exam Ended         11/20/2018  7:07 PM      Component Results     Component Value Ref Range & Units Status    Sodium 139 133 - 144 mmol/L Final    Potassium 3.7 3.4 - 5.3 mmol/L Final    Chloride 106 94 - 109 mmol/L Final    Carbon Dioxide 24 20 - 32 mmol/L Final    Anion Gap 9 3 - 14 mmol/L Final    Glucose 101 (H) 70 - 99 mg/dL Final    Urea Nitrogen 16 7 - 30 mg/dL Final    Creatinine 0.84 0.52 - 1.04 mg/dL Final    GFR Estimate 75 >60 mL/min/1.7m2  Final    Non  GFR Calc    GFR Estimate If Black >90 >60 mL/min/1.7m2 Final    African American GFR Calc    Calcium 8.8 8.5 - 10.1 mg/dL Final         11/20/2018  6:48 PM      Component Results     Component Value Ref Range & Units Status    HCG Quantitative Serum <5.0 <5.0 IU/L Final         11/20/2018  8:53 PM      Component Results     Component Value Ref Range & Units Status    Troponin I ES <0.015 0.000 - 0.045 ug/L Final    The 99th percentile for upper reference range is 0.045 ug/L.  Troponin values   in the range of 0.045 - 0.120 ug/L may be associated with risks of adverse   clinical events.                  Clinical Quality Measure: Blood Pressure Screening     Your blood pressure was checked while you were in the emergency department today. The last reading we obtained was  BP: 106/75 . Please read the guidelines below about what these numbers mean and what you should do about them.  If your systolic blood pressure (the top number) is less than 120 and your diastolic blood pressure (the bottom number) is less than 80, then your blood pressure is normal. There is nothing more that you need to do about it.  If your systolic blood pressure (the top number) is 120-139 or your diastolic blood pressure (the bottom number) is 80-89, your blood pressure may be higher than it should be. You should have your blood pressure rechecked within a year by a primary care provider.  If your systolic blood pressure (the top number) is 140 or greater or your diastolic blood pressure (the bottom number) is 90 or greater, you may have high blood pressure. High blood pressure is treatable, but if left untreated over time it can put you at risk for heart attack, stroke, or kidney failure. You should have your blood pressure rechecked by a primary care provider within the next 4 weeks.  If your provider in the emergency department today gave you specific instructions to follow-up with your doctor or provider even  sooner than that, you should follow that instruction and not wait for up to 4 weeks for your follow-up visit.        Thank you for choosing [facility]       Thank you for choosing [facility] for your care. Our goal is always to provide you with excellent care. Hearing back from our patients is one way we can continue to improve our services. Please take a few minutes to complete the written survey that you may receive in the mail after you visit with us. Thank you!        [employer] Information     [employer] gives you secure access to your electronic health record. If you see a primary care provider, you can also send messages to your care team and make appointments. If you have questions, please call your primary care clinic.  If you do not have a primary care provider, please call [phone] and they will assist you.        Care EveryWhere ID     This is your Care EveryWhere ID. This could be used by other organizations to access your [facility] medical records  [id number]        Equal Access to Services     GRUPO HULL : Leslie Mariano, martine eller, leonie langley, dennys stephenson . So [facility] [phone].    ATENCIÓN: Si habla español, tiene a perez disposición servicios gratuitos de asistencia lingüística. Llame al [phone].    We comply with applicable federal civil rights laws and [place] laws. We do not discriminate on the basis of race, color, national origin, age, disability, sex, sexual orientation, or gender identity.            After Visit Summary       This is your record. Keep this with you and show to your community pharmacist(s) and doctor(s) at your next visit.

## 2018-11-21 LAB — INTERPRETATION ECG - MUSE: NORMAL

## 2018-11-21 NOTE — ED TRIAGE NOTES
Pt has had 3 episodes of chest pain today which last approx 5 minutes each.  She is not currently having any pain.

## 2018-11-21 NOTE — ED PROVIDER NOTES
History     Chief Complaint:  Chest pain    HPI   Micheline Camargo is a 38 year old female with a history of asthma who presents to the ED for evaluation of chest pain. The patient reports that she woke up this morning with a mid-sternal chest pain. She first thought it was due to sleeping funny on her side last night. This first episode lasted for 5 minutes and she describes it as a squeezing type pain. She reports having this pain intermittently throughout the day and that during these episodes, the pain is worse with movements. She also reports experiencing tingling in bilateral hands. The second episode occurred mid-day while walking around the kitchen and also lasted for 5 minutes. The last and final episode this evening lasted around 7 minutes. She reports that she is also feeling short of breath during the episodes but is unsure if this is just her panicking. The patient does have a history of asthma and states this doesn't feel like that type of shortness of breath she experiences with asthma exacerbations. Her chest pain is not worse with deep breaths. Here in the ED, she is asymptomatic. Patient denies having any other symptoms during these episodes including nausea, vomiting, diarrhea, abdominal pain, diaphoresis, leg swelling, cough, fever, neck pain, back pain, or any other symptoms. Of note, the patient was evaluated about 5 years ago where she was diagnosed with costochondritis. Her father did have a heart attack at age 49.    CARDIAC RISK FACTORS:  Sex:    Female  Tobacco:   No  Hypertension:   No  Hyperlipidemia:  No  Diabetes:   No  Family History:  Yes    PE/DVT RISK FACTORS:  Sex:    Female  Hormones:   No  Tobacco:   No  Cancer:   No  Travel:   No  Surgery:   No  Other immobilization: No  Personal history:  No  Family history:  No    Allergies:  No known drug allergies    Medications:    Albuterol inhaler  Claritin     Past Medical History:    Asthma  IUD in place    Past Surgical History:   "  [place] teeth extraction  Tonsillectomy    Family History:    Diabetes  CAD  Hypertension  Lipids  Heart disease    Social History:  Smoking status: Never  Alcohol use: Yes  Marital Status:       Review of Systems   Constitutional: Negative for diaphoresis and fever.   Respiratory: Positive for shortness of breath. Negative for cough.    Cardiovascular: Positive for chest pain. Negative for leg swelling.   Gastrointestinal: Negative for abdominal pain, diarrhea, nausea and vomiting.   Musculoskeletal: Negative for back pain and neck pain.   Neurological: Negative for weakness and numbness.   All other systems reviewed and are negative.    Physical Exam   Patient Vitals for the past 24 hrs:   BP Temp Temp src Heart Rate Resp SpO2 Height Weight   11/20/18 2015 106/75 - - 71 - 98 % - -   11/20/18 2000 95/74 - - 68 - 100 % - -   11/20/18 1945 105/76 - - - - 100 % - -   11/20/18 1922 - - - 76 - 99 % - -   11/20/18 1921 - - - 76 - 99 % - -   11/20/18 1845 103/80 - - 75 - - - -   11/20/18 1828 108/84 - - 84 - - - -   11/20/18 1812 110/82 98.8  F (37.1  C) Oral 75 22 100 % 1.575 m (5' 2\") 98 kg (216 lb)   11/20/18 1805 110/82 - - 93 - - - -     Physical Exam  Constitutional:  Appears well-developed and well-nourished. Alert. Conversant. Non toxic.  HENT:   Head: Atraumatic.   Nose: Nose normal.  Mouth/Throat: Oral mucosa is clear and moist. no trismus. Pharynx normal. Tonsils symmetric. No tonsillar enlargement, erythema, or exudate.  Eyes: Conjunctivae normal. EOM normal. Pupils equal, round, and reactive to light. No scleral icterus.   Neck: Normal range of motion. Neck supple. No tracheal deviation present.   Cardiovascular: Normal rate, regular rhythm. No gallop. No friction rub. No murmur heard. Symmetric radial artery pulses . No JVD  Pulmonary/Chest: Effort normal. No stridor. No respiratory distress. No wheezes. No rales. No rhonchi .  Mild parasternal tenderness.   Abdominal: Soft. Bowel sounds normal. No " distension. No mass. No tenderness. No RUQ tenderness. No Aguirre sign. No rebound. No guarding.   Musculoskeletal:   RUE: Normal range of motion. No tenderness. No deformity  LUE: Normal range of motion. No tenderness. No deformity  RLE: Normal range of motion. No edema. No tenderness. No deformity  LLE: Normal range of motion. No edema. No tenderness. No deformity  Lymph: No cervical adenopathy.   Neurological: Alert and oriented to person, place, and time. Normal strength. CN II-VII intact. No sensory deficit. GCS eye subscore is 4. GCS verbal subscore is 5. GCS motor subscore is 6. Normal coordination   Skin: Skin is warm and dry. No rash noted. No pallor. Normal capillary refill.  Psychiatric:  Normal mood. Normal affect. polite. Seems intelligent    Emergency Department Course   ECG (18:08:49):  Rate 90 bpm. MD interval 160. QRS duration 80. QT/QTc 376/459. P-R-T axes 40 4 23. Sinus rhythm with fusion complexes. Otherwise normal ECG. Interpreted by Marlo Xiong MD.    Imaging:  Radiographic findings were communicated with the patient who voiced understanding of the findings.    X-ray Chest, 2 views:  Result per radiology.     Laboratory:  CBC: WNL (WBC 10.0, HGB 14.4, )  BMP: Glucose 101 (H) o/w WNL (Creatinine 0.84)  ISTAT HCG quant: <5.0  1826 - Troponin: <0.015  2023 - Troponin: <0.015    Interventions:  1850: Aspirin 324mg chewable tablet PO    Emergency Department Course:  Past medical records, nursing notes, and vitals reviewed.  1805: I performed an exam of the patient and obtained history, as documented above. GCS 15.    IV inserted and blood drawn.    The patient was sent for a x-ray while in the emergency department, findings above.    1918: I rechecked the patient. Explained the laboratory and imaging results to patient. Explained that I would like to keep her here for a repeat troponin 3 hours from the first troponin draw and if this is negative, she will be discharged home.      2105: I rechecked the patient. Findings and plan explained to the Patient. Patient discharged home with instructions regarding supportive care, medications, and reasons to return. The importance of close follow-up was reviewed.     Impression & Plan      Medical Decision Making:  Micheline Camargo is a 38 year old femalewho presents to the ER today for evaluation of chest pain that has occurred in 3 distinct no-exertional episodes today. Patient isn't sure, but she thinks maybe she slept wrong last night and had her arms folded, compressing her sternum. Differential was broad. No evidence of palpitations, syncope or other cardiac dysrhythmia.     We consider possible ACS, however workup with EKG and troponin is negative.  Given time since onset of symptoms, I do not think the patient needs to be admitted for further sets of enzymes. HEART score is 1.    EKG shows no evidence for pericarditis. Clinical presentation on suggestive of myocarditis.    Chest x-ray shows no evidence for pneumonia, pneumothorax, pulmonary edema, pleural effusion, rib fracture, cardiomegaly.  Mediastinum is normal on the x-ray and the patient has no pain through the back and symmetric pulses on exam so I doubt aortic dissection.    We considered PE for this patient. But symptoms would be highly atypical for that, and the patient is very low risk. No recent travel. No leg swelling. No OCPs. PERC neg.    She remained chest pain-free throughout her ER stay.  Discussed options for further workup.  I think patients reasonable candidate for an outpatient stress test.  Discussed indications for return to the ER if any recurrent or worsening pain.  Patient agreeable.    Critical Care time:  none    Diagnosis:    ICD-10-CM    1. Acute chest pain R07.9 Exercise Stress Echocardiogram       Disposition:  discharged to home    Discharge Medications:  New Prescriptions    ASPIRIN 81 MG TABLET    Take 1 tablet (81 mg) by mouth daily         Adri  Nakia  11/20/2018   [facility] EMERGENCY DEPARTMENT  I, Adri Yee, am serving as a scribe at 6:05 PM on 11/20/2018 to document services personally performed by Marlo Xiong MD based on my observations and the provider's statements to me.        Marlo Xiong MD  11/21/18 0000

## 2018-12-06 ENCOUNTER — HOSPITAL ENCOUNTER (OUTPATIENT)
Dept: CARDIOLOGY | Facility: CLINIC | Age: 38
Discharge: HOME | End: 2018-12-06
Attending: EMERGENCY MEDICINE | Admitting: EMERGENCY MEDICINE
Payer: COMMERCIAL

## 2018-12-06 DIAGNOSIS — R07.9 ACUTE CHEST PAIN: ICD-10-CM

## 2018-12-06 PROCEDURE — 40000264 ECHO STRESS WITH OPTISON

## 2018-12-06 PROCEDURE — 93325 DOPPLER ECHO COLOR FLOW MAPG: CPT | Mod: 26 | Performed by: INTERNAL MEDICINE

## 2018-12-06 PROCEDURE — 93018 CV STRESS TEST I&R ONLY: CPT | Performed by: INTERNAL MEDICINE

## 2018-12-06 PROCEDURE — 25500064 ZZH RX 255 OP 636: Performed by: EMERGENCY MEDICINE

## 2018-12-06 PROCEDURE — 93321 DOPPLER ECHO F-UP/LMTD STD: CPT | Mod: 26 | Performed by: INTERNAL MEDICINE

## 2018-12-06 PROCEDURE — 93016 CV STRESS TEST SUPVJ ONLY: CPT | Performed by: INTERNAL MEDICINE

## 2018-12-06 PROCEDURE — 93350 STRESS TTE ONLY: CPT | Mod: 26 | Performed by: INTERNAL MEDICINE

## 2018-12-06 RX ADMIN — HUMAN ALBUMIN MICROSPHERES AND PERFLUTREN 3 ML: 10; .22 INJECTION, SOLUTION INTRAVENOUS at 10:15

## 2019-03-14 ENCOUNTER — MYC REFILL (OUTPATIENT)
Dept: PEDIATRICS | Facility: CLINIC | Age: 39
End: 2019-03-14

## 2019-03-14 DIAGNOSIS — R06.2 WHEEZE: Primary | ICD-10-CM

## 2019-03-14 DIAGNOSIS — J45.901 EXACERBATION OF ASTHMA: ICD-10-CM

## 2019-03-14 RX ORDER — ALBUTEROL SULFATE 90 UG/1
2 AEROSOL, METERED RESPIRATORY (INHALATION) EVERY 6 HOURS PRN
Status: CANCELLED | OUTPATIENT
Start: 2019-03-14

## 2019-03-15 RX ORDER — ALBUTEROL SULFATE 90 UG/1
2 AEROSOL, METERED RESPIRATORY (INHALATION) EVERY 6 HOURS PRN
Qty: 6.7 G | Refills: 1 | Status: SHIPPED | OUTPATIENT
Start: 2019-03-15 | End: 2020-11-23

## 2019-07-15 DIAGNOSIS — Z12.31 VISIT FOR SCREENING MAMMOGRAM: ICD-10-CM

## 2019-07-15 PROCEDURE — 77067 SCR MAMMO BI INCL CAD: CPT | Mod: TC

## 2019-07-15 PROCEDURE — 77063 BREAST TOMOSYNTHESIS BI: CPT | Mod: TC

## 2019-10-16 ENCOUNTER — OFFICE VISIT (OUTPATIENT)
Dept: PODIATRY | Facility: CLINIC | Age: 39
End: 2019-10-16
Payer: COMMERCIAL

## 2019-10-16 VITALS
WEIGHT: 216 LBS | HEIGHT: 62 IN | SYSTOLIC BLOOD PRESSURE: 122 MMHG | BODY MASS INDEX: 39.75 KG/M2 | DIASTOLIC BLOOD PRESSURE: 62 MMHG

## 2019-10-16 DIAGNOSIS — Q66.70 PES CAVUS: ICD-10-CM

## 2019-10-16 DIAGNOSIS — M72.2 PLANTAR FASCIITIS, RIGHT: ICD-10-CM

## 2019-10-16 DIAGNOSIS — M79.671 RIGHT FOOT PAIN: Primary | ICD-10-CM

## 2019-10-16 PROCEDURE — 99203 OFFICE O/P NEW LOW 30 MIN: CPT | Performed by: PODIATRIST

## 2019-10-16 RX ORDER — INDOMETHACIN 25 MG/1
25 CAPSULE ORAL 2 TIMES DAILY WITH MEALS
Qty: 20 CAPSULE | Refills: 0 | Status: SHIPPED | OUTPATIENT
Start: 2019-10-16 | End: 2020-11-23

## 2019-10-16 ASSESSMENT — MIFFLIN-ST. JEOR: SCORE: 1603.02

## 2019-10-16 NOTE — PATIENT INSTRUCTIONS
Thank you for choosing [facility]!    DR. GARCIA'S CLINIC SCHEDULE  MONDAY AM - [place] TUESDAY - [place]   [address] [address] [place] [address] [address] [place] [address]   [phone] / [phone] / [phone]       WEDNESDAY - [place] FRIDAY AM - [facility]   [address]   [place] [address] [place] [address]   [phone] / [phone]       FRIDAY PM - [place] SCHEDULE SURGERY: [phone]   [address] BILLING QUESTIONS: [phone]   [place] [address] AFTER HOURS: [phone]   [phone] / [phone] APPOINTMENTS: [phone]     [employer] Price [employer] (CPL) [phone]       PLANTAR FASCIITIS  Plantar fasciitis is often referred to as heel spurs or heel pain. Plantar fasciitis is a very common problem that affects people of all foot shapes, age, weight and activity level. Pain may be in the arch or on the weight-bearing surface of the heel. The pain may come on without injury or identifiable cause. Pain is generally present when first getting out of bed in the morning or up from a seated break.     CAUSES  The plantar fascia is a dense fibrous band of tissue that stretches across the bottom surface of the foot. The fascia helps support the foot muscles and arch. Plantar fasciitis is thought to be caused by mechanical strain or overload. Frequent walking without shoes or wearing unsupportive shoes is thought to cause structural overload and ultimately inflammation of the plantar fascia. Some people have heel spurs that can be seen on x-ray. The heel spur is actually a minor component of plantar fascitis and is largely ignored.       SELF TREATMENT   The easiest solution is to stop walking around your home without shoes. Plantar fasciitis is largely a shoe problem. Shoes are either not being worn often enough or your current shoes are inadequate for your weight,  foot structure or activity level. The majority of shoes on the market today are not sufficient to resist development of plantar fasciitis or to promote healing. Assume that your current shoes are inadequate and will need to be replaced. Even high quality shoes wear out with 6 months to one year of frequent use. Weight loss is another option. Losing ten pounds in the next two months may be enough to resolve the problem. Ice applied to the area of pain two to three times per day for ten minutes each session can be very helpful. Warm foot soaks in epsom salts can also relieve pain. This should continue until the problem resolves. Achilles tendon stretching is essential. Stretch multiple times daily to promote healing and to prevent recurrence in the future. Over all stretching of the body is helpful as well such as the calves, thighs and lower back. Normally when one area of the body is tight, other areas are too. Gentle Yoga can be good for this.     Over the counter topical anti inflammatories can be helpful such as biofreeze, bengay, salon pas, ect...  Oral ibuprofen or aleve is recommended as well to try to calm down inflammation.     Night splints can be helpful to gradually stretch the foot at night as a lot of pain is when you get up in the morning. Taking a towel or thera band and stretching the foot back multiple times before you get ou of bed can be beneficial as well.     MEDICAL TREATMENT  Medical treatments often include custom arch supports, cortisone injections, physical therapy, splints to be worn in bed, prescription medications and surgery. The home treatments listed above will be necessary regardless of these advanced medical treatments. Surgery is rarely needed but is very helpful in selected cases.     PROGNOSIS  Plantar fasciitis can last from one day to a lifetime. Some people get intermittent fascitis that is very short-lived. Others suffer daily for years. Excessive body weight, frequent bare  foot walking, long hours on the feet, inadequate shoes, predisposing foot structures and excessive activity such as running are all potential issues that lead to chronic and/or recurring plantar fascitis. Having plantar fasciitis means that you are forever prone to this problem and will require modification of some of the above factors. Most people seek treatment within one to four months. Healing usually requires a similar one to four month time frame. Healing time is relative to the amount of effort spent treating the problem.   Plantar fasciitis is highly recurrent. Risk factors often continue, including return to bare foot walking, inadequate shoes, excessive body weight, excessive activities, etc. Your life style and foot structure may predispose you to recurrent plantar fasciitis. A daily prevention regimen can be very helpful. Ongoing use of shoe inserts, careful attention to appropriate shoes, daily Achilles stretching, etc. may prevent recurrence. Prompt attention at the earliest warning signs of heel pain can resolve the problem in as short as a few days.     EXERCISES  Stair Exercise: Step on the stairs with the ball of your foot and hold your position for at least 15 seconds, then slowly step down with the heels of your foot. You can do this daily and as often as you want.   Picking the Towel: Sit comfortably and then pick the towel up with your toes. You can use any object other than a towel as long as the material can be soft and you can pick it up with your toes.  Rolling the Bottle: Use a small ball or frozen water bottle and then roll it around with your foot.   Flex the Toes: Sit comfortably and then flex your toes by pointing it towards the floor or towards your body. This will relax and flex your foot and exercise your plantar fascia, the calf, and the Achilles tendon. The inability of the foot to stretch often causes the bunching up of the plantar fascia area leading to the pain.  Calf/Achilles  Stretching: Lay on you back and raise one foot, then point your toes towards the floor. See photo below:               Hold each stretch for 10 seconds. Stretch 10 times per set, three sets per day. Morning, afternoon and evening. If your heel pain is very severe in the morning, consider doing the first set of stretches before you get out of bed.      OVER THE COUNTER INSERT RECOMMENDATIONS  SuperFeet   Sofsole Fit Spenco   Power Step   Walk-Fit Arch Cradles     Most of these can be found at your local [employer], [employer], or online.  **A good high quality over the counter insert should cost around $40-$50      [email or web address] LOCATIONS  [place]  [address]  [phone]   [address]  [phone] [place]  [address]  [phone]   [place]  [address]  [phone]   [place]  [address]  [phone] [place]  [address] [place] [address] [place]  [phone]   [place]  [address]  [phone]   [place]  [address] [place] [address]  [phone] [employer]  [address]  [phone]           BODY WEIGHT AND YOUR FEET  The following information is included in the after visit summary for all patients. Body weight can be a sensitive issue to discuss in clinic, but we think the following information is very important. Although we focus on the feet and ankles, we do support the overall health of our patients.     Many things can cause foot and ankle problems. Foot structure, activity level, foot mechanics and injuries are common causes of pain. One very important issue that often goes unmentioned, is body weight. Extra weight can cause increased stress on muscles, ligaments, bones and tendons. Sometimes just a few extra pounds is all it takes to put one over her/his threshold. Without reducing that stress, it can be difficult to alleviate pain. As Foot & Ankle specialists, our job is addressing the lower extremity problem and  possible causes. Regarding extra body weight, we encourage patients to discuss diet and weight management plans with their primary care doctors. It is this team approach that gives you the best opportunity for pain relief and getting you back on your feet.      [place] has a Comprehensive Weight Management Program. This program includes counseling, education, non-surgical and surgical approaches to weight loss. If you are interested in learning more either talk to you primary care provider or call [phone].

## 2019-10-16 NOTE — PROGRESS NOTES
PATIENT HISTORY:   Micheline Camargo is a 39 year old female who presents to clinic for pain to the right heel. Notes that it has been sore intermittently for about 5 months. Pain  Is 6/10 at its worst. Mostly in the morning or after being on feet for a while. Denies injury. Currently not wearing inserts. Did have plantar fasciitis years ago. Had injections with helped. Wondering if this is the same and what can be done for it.     Review of Systems:  Patient denies fever, chills, rash, wound, stiffness, numbness, weakness, heart burn, blood in stool, chest pain with activity, calf pain when walking, shortness of breath with activity, chronic cough, easy bleeding/bruising, swelling of ankles, excessive thirst, fatigue, depression, anxiety.  Patient admits to limping at times.     PAST MEDICAL HISTORY:   Past Medical History:   Diagnosis Date     Asthma         PAST SURGICAL HISTORY:   Past Surgical History:   Procedure Laterality Date     C IUD,MIRENA  6/2010, 6/17/15     HC TOOTH EXTRACTION W/FORCEP      [place]is[place] teeth extraction     TONSILLECTOMY  age 8        MEDICATIONS:   Current Outpatient Medications:      albuterol (PROAIR HFA/PROVENTIL HFA/VENTOLIN HFA) 108 (90 Base) MCG/ACT inhaler, Inhale 2 puffs into the lungs every 6 hours as needed for shortness of breath / dyspnea or wheezing, Disp: 6.7 g, Rfl: 1     aspirin 81 MG tablet, Take 1 tablet (81 mg) by mouth daily, Disp: 7 tablet, Rfl: 0     loratadine (CLARITIN) 10 MG tablet, Take 1 tablet (10 mg) by mouth daily, Disp: 30 tablet, Rfl: 1     ALLERGIES:  No Known Allergies     SOCIAL HISTORY:   Social History     Socioeconomic History     Marital status:      Spouse name: Agusto     Number of children: 1     Years of education: 16     Highest education level: Not on file   Occupational History     Occupation:      Comment: [employer]   Social Needs     Financial resource strain: Not on file     Food insecurity:     Worry:  "Not on file     Inability: Not on file     Transportation needs:     Medical: Not on file     Non-medical: Not on file   Tobacco Use     Smoking status: Never Smoker     Smokeless tobacco: Never Used   Substance and Sexual Activity     Alcohol use: Yes     Alcohol/week: 1.7 standard drinks     Types: 2 Glasses of wine per week     Drug use: No     Sexual activity: Yes     Partners: Male     Birth control/protection: Condom, I.U.D.     Comment: Mirena IUD 6/17/15   Lifestyle     Physical activity:     Days per week: Not on file     Minutes per session: Not on file     Stress: Not on file   Relationships     Social connections:     Talks on phone: Not on file     Gets together: Not on file     Attends Confucianist service: Not on file     Active member of club or organization: Not on file     Attends meetings of clubs or organizations: Not on file     Relationship status: Not on file     Intimate partner violence:     Fear of current or ex partner: Not on file     Emotionally abused: Not on file     Physically abused: Not on file     Forced sexual activity: Not on file   Other Topics Concern     Parent/sibling w/ CABG, MI or angioplasty before 65F 55M? Yes   Social History Narrative     Not on file        FAMILY HISTORY:   Family History   Problem Relation Age of Onset     Diabetes Father      C.A.D. Father      Hypertension Father      Lipids Father      Heart Disease Father         anglioplasty at late 40s     Neurologic Disorder Maternal Grandmother         dementia     Cancer Maternal Grandfather         leukemia     C.A.D. Paternal Grandfather         MI        EXAM:Vitals: Ht 1.575 m (5' 2\")   Wt 98 kg (216 lb)   BMI 39.51 kg/m    BMI= Body mass index is 39.51 kg/m .    General appearance: Patient is alert and fully cooperative with history & exam.  No sign of distress is noted during the visit.     Psychiatric: Affect is pleasant & appropriate.  Patient appears motivated to improve health.     Respiratory: " Breathing is regular & unlabored while sitting.     HEENT: Hearing is intact to spoken word.  Speech is clear.  No gross evidence of visual impairment that would impact ambulation.     Dermatologic: Skin is intact to both lower extremities without significant lesions, rash or abrasion.  No paronychia or evidence of soft tissue infection is noted.     Vascular: DP & PT pulses are intact & regular bilaterally.  No significant edema or varicosities noted.  CFT and skin temperature is normal to both lower extremities.     Neurologic: Lower extremity sensation is intact to light touch.  No evidence of weakness or contracture in the lower extremities.  No evidence of neuropathy.     Musculoskeletal: Patient is ambulatory without assistive device or brace.  Increased arch height.  Pain on palpation of both plantar heels.       ASSESSMENT: plantar fasciitis, pes cavus, metatarsalgia     PLAN:  [facility] in [facility].  The potential causes and nature of plantar fasciitis were discussed with the patient.  We reviewed the natural history/prognosis of the condition and risks if left untreated.  These include chronic pain, other sites of pain due to gait changes, and potential plantar fascial rupture.      We discussed possible causes of the condition as it relates to the patients specific situation.      Conservative treatment options were reviewed:  appropriate shoes, avoidance of barefoot walking, inserts/orthoses, stretching, ice, massage, immobilization and NSAIDs.     We also reviewed the options of injection therapy and surgery.  However, it was made clear that surgery is only considered when conservative therapy fails.  The risks and benefits of injection therapy, and surgery were discussed.     After thorough discussion and answering all questions, the patient elected to try orthotics, stretching, night splint, oral and topical nsaids. .  If pain continues, recommend injection, boot, physical therapy o rMRI of  ankle. .  We discussed not wearing flats and heels or walking around the house barefoot.     Esther Bae DPM, Podiatry/Foot and Ankle Surgery    Weight management plan: Patient was referred to their PCP to discuss a diet and exercise plan.    Recommended to Micheline Camargo to follow up with Primary Care provider regarding elevated blood pressure.

## 2019-10-16 NOTE — LETTER
10/16/2019         RE: Micheline Camargo  [address]  [facility] [address]        Dear Colleague,    Thank you for referring your patient, Micheline Camargo, to the [facility]. Please see a copy of my visit note below.    PATIENT HISTORY:   Micheline Camargo is a 39 year old female who presents to clinic for pain to the right heel. Notes that it has been sore intermittently for about 5 months. Pain  Is 6/10 at its worst. Mostly in the morning or after being on feet for a while. Denies injury. Currently not wearing inserts. Did have plantar fasciitis years ago. Had injections with helped. Wondering if this is the same and what can be done for it.     Review of Systems:  Patient denies fever, chills, rash, wound, stiffness, numbness, weakness, heart burn, blood in stool, chest pain with activity, calf pain when walking, shortness of breath with activity, chronic cough, easy bleeding/bruising, swelling of ankles, excessive thirst, fatigue, depression, anxiety.  Patient admits to limping at times.     PAST MEDICAL HISTORY:   Past Medical History:   Diagnosis Date     Asthma         PAST SURGICAL HISTORY:   Past Surgical History:   Procedure Laterality Date     C IUD,MIRENA  6/2010, 6/17/15     HC TOOTH EXTRACTION W/FORCEP      wisdom teeth extraction     TONSILLECTOMY  age 8        MEDICATIONS:   Current Outpatient Medications:      albuterol (PROAIR HFA/PROVENTIL HFA/VENTOLIN HFA) 108 (90 Base) MCG/ACT inhaler, Inhale 2 puffs into the lungs every 6 hours as needed for shortness of breath / dyspnea or wheezing, Disp: 6.7 g, Rfl: 1     aspirin 81 MG tablet, Take 1 tablet (81 mg) by mouth daily, Disp: 7 tablet, Rfl: 0     loratadine (CLARITIN) 10 MG tablet, Take 1 tablet (10 mg) by mouth daily, Disp: 30 tablet, Rfl: 1     ALLERGIES:  No Known Allergies     SOCIAL HISTORY:   Social History     Socioeconomic History     Marital status:      Spouse name: Agusto     Number of children: 1      "Years of education: 16     Highest education level: Not on file   Occupational History     Occupation:      Comment: [employer]   Social Needs     Financial resource strain: Not on file     Food insecurity:     Worry: Not on file     Inability: Not on file     Transportation needs:     Medical: Not on file     Non-medical: Not on file   Tobacco Use     Smoking status: Never Smoker     Smokeless tobacco: Never Used   Substance and Sexual Activity     Alcohol use: Yes     Alcohol/week: 1.7 standard drinks     Types: 2 Glasses of wine per week     Drug use: No     Sexual activity: Yes     Partners: Male     Birth control/protection: Condom, I.U.D.     Comment: Mirena IUD 6/17/15   Lifestyle     Physical activity:     Days per week: Not on file     Minutes per session: Not on file     Stress: Not on file   Relationships     Social connections:     Talks on phone: Not on file     Gets together: Not on file     Attends Hindu service: Not on file     Active member of club or organization: Not on file     Attends meetings of clubs or organizations: Not on file     Relationship status: Not on file     Intimate partner violence:     Fear of current or ex partner: Not on file     Emotionally abused: Not on file     Physically abused: Not on file     Forced sexual activity: Not on file   Other Topics Concern     Parent/sibling w/ CABG, MI or angioplasty before 65F 55M? Yes   Social History Narrative     Not on file        FAMILY HISTORY:   Family History   Problem Relation Age of Onset     Diabetes Father      C.A.D. Father      Hypertension Father      Lipids Father      Heart Disease Father         anglioplasty at late 40s     Neurologic Disorder Maternal Grandmother         dementia     Cancer Maternal Grandfather         leukemia     C.A.D. Paternal Grandfather         MI        EXAM:Vitals: Ht 1.575 m (5' 2\")   Wt 98 kg (216 lb)   BMI 39.51 kg/m     BMI= Body mass index is 39.51 " kg/m .    General appearance: Patient is alert and fully cooperative with history & exam.  No sign of distress is noted during the visit.     Psychiatric: Affect is pleasant & appropriate.  Patient appears motivated to improve health.     Respiratory: Breathing is regular & unlabored while sitting.     HEENT: Hearing is intact to spoken word.  Speech is clear.  No gross evidence of visual impairment that would impact ambulation.     Dermatologic: Skin is intact to both lower extremities without significant lesions, rash or abrasion.  No paronychia or evidence of soft tissue infection is noted.     Vascular: DP & PT pulses are intact & regular bilaterally.  No significant edema or varicosities noted.  CFT and skin temperature is normal to both lower extremities.     Neurologic: Lower extremity sensation is intact to light touch.  No evidence of weakness or contracture in the lower extremities.  No evidence of neuropathy.     Musculoskeletal: Patient is ambulatory without assistive device or brace.  Increased arch height.  Pain on palpation of both plantar heels.       ASSESSMENT: plantar fasciitis, pes cavus, metatarsalgia     PLAN:  Reviewed [facility] in [facility].  The potential causes and nature of plantar fasciitis were discussed with the patient.  We reviewed the natural history/prognosis of the condition and risks if left untreated.  These include chronic pain, other sites of pain due to gait changes, and potential plantar fascial rupture.      We discussed possible causes of the condition as it relates to the patients specific situation.      Conservative treatment options were reviewed:  appropriate shoes, avoidance of barefoot walking, inserts/orthoses, stretching, ice, massage, immobilization and NSAIDs.     We also reviewed the options of injection therapy and surgery.  However, it was made clear that surgery is only considered when conservative therapy fails.  The risks and benefits of injection therapy,  and surgery were discussed.     After thorough discussion and answering all questions, the patient elected to try orthotics, stretching, night splint, oral and topical nsaids. .  If pain continues, recommend injection, boot, physical therapy o rMRI of ankle. .  We discussed not wearing flats and heels or walking around the house barefoot.     Esther Bae DPM, Podiatry/Foot and Ankle Surgery    Weight management plan: Patient was referred to their PCP to discuss a diet and exercise plan.    Recommended to Micheline Camargo to follow up with Primary Care provider regarding elevated blood pressure.        Again, thank you for allowing me to participate in the care of your patient.        Sincerely,        Esther Bae DPM, Podiatry/Foot and Ankle Surgery

## 2020-02-24 ENCOUNTER — HEALTH MAINTENANCE LETTER (OUTPATIENT)
Age: 40
End: 2020-02-24

## 2020-05-07 ENCOUNTER — MYC MEDICAL ADVICE (OUTPATIENT)
Dept: PEDIATRICS | Facility: CLINIC | Age: 40
End: 2020-05-07

## 2020-05-07 NOTE — TELEPHONE ENCOUNTER
Please let her know mirena iud can be in place x 7 yrs, if it's due to be replaced, ok to schedule with me on my ftf week (6/1)

## 2020-05-07 NOTE — TELEPHONE ENCOUNTER
I called and spoke to the pt and she says it's been in place 5 years and doesn't need it to be replaced at this time.   Yoli Tejada on 5/7/2020 at 2:44 PM

## 2020-05-07 NOTE — TELEPHONE ENCOUNTER
IUD's are currently on our list of essential appointments that we are making.  She can call to schedule.  I sent her a message saying this.    Georgia Ramos RN

## 2020-07-10 ENCOUNTER — VIRTUAL VISIT (OUTPATIENT)
Dept: FAMILY MEDICINE | Facility: OTHER | Age: 40
End: 2020-07-10

## 2020-07-10 ENCOUNTER — AMBULATORY - HEALTHEAST (OUTPATIENT)
Dept: FAMILY MEDICINE | Facility: CLINIC | Age: 40
End: 2020-07-10

## 2020-07-10 DIAGNOSIS — Z20.822 SUSPECTED COVID-19 VIRUS INFECTION: ICD-10-CM

## 2020-07-10 NOTE — PROGRESS NOTES
"Date: 07/10/2020 10:14:45  Clinician: Micheline Tuttle  Clinician NPI: [id number]  Patient: Micheline Camargo  Patient : 1980  [address] [facility] [address] [id number]  Patient Phone: [phone]  Visit Protocol: URI  Patient Summary:  Micheline is a 40 year old ( : 1980 ) female who initiated a Visit for COVID-19 (Coronavirus) evaluation and screening. When asked the question \"Please sign me up to receive news, health information and promotions. \", Micheline responded \"No\".    Micheline states her symptoms started gradually 3-4 days ago. After her symptoms started, they improved and then got worse again.   Her symptoms consist of malaise, a headache, and a cough. She is experiencing mild difficulty breathing with activities but can speak normally in full sentences. Micheline also feels feverish.   Symptom details     Cough: Micheline coughs a few times an hour and her cough is not more bothersome at night. Phlegm does not come into her throat when she coughs. She does not believe her cough is caused by post-nasal drip.     Temperature: Her current temperature is 99 degrees Fahrenheit.     Headache: She states the headache is moderate (4-6 on a 10 point pain scale).      Micheline denies having wheezing, nausea, teeth pain, ageusia, diarrhea, vomiting, rhinitis, ear pain, chills, sore throat, enlarged lymph nodes, myalgias, anosmia, facial pain or pressure, and nasal congestion. She also denies having recent facial or sinus surgery in the past 60 days, taking antibiotic medication in the past month, and having a sinus infection within the past year.   Precipitating events  She has not recently been exposed to someone with influenza. Micheline has not been in close contact with any high risk individuals.   Pertinent COVID-19 (Coronavirus) information  In the past 14 days, Micheline has not worked in a congregate living setting.   She does not work or volunteer as healthcare worker " or a  and does not work or volunteer in a healthcare facility.   Micheline also has not lived in a congregate living setting in the past 14 days. She does not live with a healthcare worker.   Micheline has not had a close contact with a laboratory-confirmed COVID-19 patient within 14 days of symptom onset.   Pertinent medical history  Micheline typically gets a yeast infection when she takes antibiotics. She is not sure if she has used fluconazole (Diflucan) to treat previous yeast infections.   Micheline does not need a return to work/school note.   Weight: 200 lbs   Micheline does not smoke or use smokeless tobacco.   She denies pregnancy and denies breastfeeding. She does not menstruate.   Additional information as reported by the patient (free text): We traveled last week, stayed in three different hotels in [place] and [place].  I'm experiencing shortness of breath, headaches.  I have asthma so am used to shortness of breath but this feels very different than normal for me.  We have a new puppy and sometimes I can experience allergies to pets too but again, this feels very different than that.   Weight: 200 lbs    MEDICATIONS: albuterol sulfate inhalation, ALLERGIES: NKDA  Clinician Response:  Dear Micheline,   Your symptoms show that you may have coronavirus (COVID-19). This illness can cause fever, cough and trouble breathing. Many people get a mild case and get better on their own. Some people can get very sick.  What should I do?  We would like to test you for this virus.   1. Please call [phone] to schedule your visit. Explain that you were referred by [facility] to have a COVID-19 test. Be ready to share your [facility] visit ID number.  The following will serve as your written order for this COVID Test, ordered by me, for the indication of suspected COVID [Z20.828]: The test will be ordered in [employer], our electronic health record, after you are scheduled. It will show as ordered and authorized  "by Maikel Bowman MD.  Order: COVID-19 (Coronavirus) PCR for SYMPTOMATIC testing from [facility].      2. When it's time for your COVID test:  Stay at least 6 feet away from others. (If someone will drive you to your test, stay in the backseat, as far away from the  as you can.)   Cover your mouth and nose with a mask, tissue or washcloth.  Go straight to the testing site. Don't make any stops on the way there or back.      3.Starting now: Stay home and away from others (self-isolate) until:   You've had no fever---and no medicine that reduces fever---for 3 full days (72 hours). And...   Your other symptoms have gotten better. For example, your cough or breathing has improved. And...   At least 10 days have passed since your symptoms started.       During this time, don't leave the house except for testing or medical care.   Stay in your own room, even for meals. Use your own bathroom if you can.   Stay away from others in your home. No hugging, kissing or shaking hands. No visitors.  Don't go to work, school or anywhere else.    Clean \"high touch\" surfaces often (doorknobs, counters, handles, etc.). Use a household cleaning spray or wipes. You'll find a full list of  on the EPA website: www.epa.gov/pesticide-registration/list-n-disinfectants-use-against-sars-cov-2.   Cover your mouth and nose with a mask, tissue or washcloth to avoid spreading germs.  Wash your hands and face often. Use soap and water.  Caregivers in these groups are at risk for severe illness due to COVID-19:  o People 65 years and older  o People who live in a nursing home or long-term care facility  o People with chronic disease (lung, heart, cancer, diabetes, kidney, liver, immunologic)  o People who have a weakened immune system, including those who:   Are in cancer treatment  Take medicine that weakens the immune system, such as corticosteroids  Had a bone marrow or organ transplant  Have an immune deficiency  Have poorly controlled " HIV or AIDS  Are obese (body mass index of 40 or higher)  Smoke regularly   o Caregivers should wear gloves while washing dishes, handling laundry and cleaning bedrooms and bathrooms.  o Use caution when washing and drying laundry: Don't shake dirty laundry, and use the warmest water setting that you can.  o For more tips, go to www.cdc.gov/coronavirus/2019-ncov/downloads/10Things.pdf.    4.Sign up for [employer]. We know it's scary to hear that you might have COVID-19. We want to track your symptoms to make sure you're okay over the next 2 weeks. Please look for an email from [employer]---this is a free, online program that we'll use to keep in touch. To sign up, follow the link in the email. Learn more at [email or web address]  How can I take care of myself?   Get lots of rest. Drink extra fluids (unless a doctor has told you not to).   Take Tylenol (acetaminophen) for fever or pain. If you have liver or kidney problems, ask your family doctor if it's okay to take Tylenol.   Adults can take either:    650 mg (two 325 mg pills) every 4 to 6 hours, or...   1,000 mg (two 500 mg pills) every 8 hours as needed.    Note: Don't take more than 3,000 mg in one day. Acetaminophen is found in many medicines (both prescribed and over-the-counter medicines). Read all labels to be sure you don't take too much.   For children, check the Tylenol bottle for the right dose. The dose is based on the child's age or weight.    If you have other health problems (like cancer, heart failure, an organ transplant or severe kidney disease): Call your specialty clinic if you don't feel better in the next 2 days.       Know when to call 911. Emergency warning signs include:    Trouble breathing or shortness of breath Pain or pressure in the chest that doesn't go away Feeling confused like you haven't felt before, or not being able to wake up Bluish-colored lips or face.  Where can I get more information?   [facility] --  About COVID-19: [email or web address]   CDC -- What to Do If You're Sick: www.cdc.gov/coronavirus/2019-ncov/about/steps-when-sick.html   CDC -- Ending Home Isolation: www.cdc.gov/coronavirus/2019-ncov/hcp/disposition-in-home-patients.html   [facility] -- Caring for Someone: www.cdc.gov/coronavirus/2019-ncov/if-you-are-sick/care-for-someone.html   [facility] -- Interim Guidance for Hospital Discharge to Home: www.[facility].[place]./diseases/coronavirus/hcp/hospdischarge.pdf   [facility] clinical trials (COVID-19 research studies): clinicalaffairs.[facility]-clinical-trials    Below are the COVID-19 hotlines at the [place] Department of Health ([facility]). Interpreters are available.    For health questions: Call 432-645-6036 or 1-823.245.9602 (7 a.m. to 7 p.m.) For questions about schools and childcare: Call 028-205-0230 or 1-535.493.8650 (7 a.m. to 7 p.m.)    COVID-19 (Coronavirus) General Information  Because there is currently no vaccine to prevent infection, the best way to protect yourself is to avoid being exposed to this virus. Common symptoms of COVID-19 include but are not limited to fever, cough, and shortness of breath. These symptoms appear 2-14 days after you are exposed to the virus that causes COVID-19. Click here for more information from the CDC on how to protect yourself.  If you are sick with COVID-19 or suspect you are infected with the virus that causes COVID-19, follow the steps here from the CDC to help prevent the disease from spreading to people in your home and community.  Click here for general information from the CDC on testing.  If you develop any of these emergency warning signs for COVID-19, get medical attention immediately:     Trouble breathing    Persistent pain or pressure in the chest    New confusion or inability to arouse    Bluish lips or face      Call your doctor or clinic before going in. Call [phone] if you have a medical emergency and notify the  you have or  think you may have COVID-19.  For more detailed and up to date information on COVID-19 (Coronavirus), please visit the CDC website.   Diagnosis: Cough  Diagnosis ICD: R05  Additional Clinician Notes: You may continue to use your inhaler as needed if it is helpful. You may also want to take allergy medication such as Claritin or Zyrtec daily given your potential pet allergy.

## 2020-07-12 ENCOUNTER — AMBULATORY - HEALTHEAST (OUTPATIENT)
Dept: FAMILY MEDICINE | Facility: CLINIC | Age: 40
End: 2020-07-12

## 2020-07-12 DIAGNOSIS — Z20.822 SUSPECTED COVID-19 VIRUS INFECTION: ICD-10-CM

## 2020-10-16 DIAGNOSIS — Z12.31 VISIT FOR SCREENING MAMMOGRAM: ICD-10-CM

## 2020-11-22 ASSESSMENT — ENCOUNTER SYMPTOMS
DYSURIA: 0
HEMATOCHEZIA: 0
EYE PAIN: 0
DIZZINESS: 0
BREAST MASS: 0
JOINT SWELLING: 0
FEVER: 0
FREQUENCY: 0
ABDOMINAL PAIN: 0
HEADACHES: 0
HEARTBURN: 0
MYALGIAS: 0
PALPITATIONS: 0
SHORTNESS OF BREATH: 0
WEAKNESS: 0
CHILLS: 0
SORE THROAT: 0
COUGH: 0
NAUSEA: 0
NERVOUS/ANXIOUS: 0
CONSTIPATION: 0
HEMATURIA: 0
DIARRHEA: 0
ARTHRALGIAS: 0
PARESTHESIAS: 0

## 2020-11-23 ENCOUNTER — OFFICE VISIT (OUTPATIENT)
Dept: PEDIATRICS | Facility: CLINIC | Age: 40
End: 2020-11-23
Payer: COMMERCIAL

## 2020-11-23 VITALS
DIASTOLIC BLOOD PRESSURE: 68 MMHG | HEIGHT: 62 IN | BODY MASS INDEX: 40.89 KG/M2 | WEIGHT: 222.2 LBS | TEMPERATURE: 98.5 F | RESPIRATION RATE: 14 BRPM | SYSTOLIC BLOOD PRESSURE: 116 MMHG | OXYGEN SATURATION: 97 % | HEART RATE: 80 BPM

## 2020-11-23 DIAGNOSIS — Z00.00 ROUTINE GENERAL MEDICAL EXAMINATION AT A HEALTH CARE FACILITY: Primary | ICD-10-CM

## 2020-11-23 DIAGNOSIS — R13.10 DYSPHAGIA, UNSPECIFIED TYPE: ICD-10-CM

## 2020-11-23 DIAGNOSIS — J45.20 MILD INTERMITTENT ASTHMA, UNSPECIFIED WHETHER COMPLICATED: ICD-10-CM

## 2020-11-23 DIAGNOSIS — Z11.59 NEED FOR HEPATITIS C SCREENING TEST: ICD-10-CM

## 2020-11-23 DIAGNOSIS — Z82.49 FAMILY HISTORY OF ISCHEMIC HEART DISEASE: ICD-10-CM

## 2020-11-23 DIAGNOSIS — E66.01 MORBID OBESITY (H): ICD-10-CM

## 2020-11-23 PROCEDURE — 99213 OFFICE O/P EST LOW 20 MIN: CPT | Mod: 25 | Performed by: NURSE PRACTITIONER

## 2020-11-23 PROCEDURE — 90471 IMMUNIZATION ADMIN: CPT | Performed by: NURSE PRACTITIONER

## 2020-11-23 PROCEDURE — 90732 PPSV23 VACC 2 YRS+ SUBQ/IM: CPT | Performed by: NURSE PRACTITIONER

## 2020-11-23 PROCEDURE — 99386 PREV VISIT NEW AGE 40-64: CPT | Mod: 25 | Performed by: NURSE PRACTITIONER

## 2020-11-23 RX ORDER — ALBUTEROL SULFATE 90 UG/1
2 AEROSOL, METERED RESPIRATORY (INHALATION) EVERY 6 HOURS PRN
Qty: 6.7 G | Refills: 1 | Status: SHIPPED | OUTPATIENT
Start: 2020-11-23 | End: 2021-12-13

## 2020-11-23 RX ORDER — INFLUENZA VIRUS VACCINE 15; 15; 15; 15 UG/.5ML; UG/.5ML; UG/.5ML; UG/.5ML
SUSPENSION INTRAMUSCULAR
COMMUNITY
Start: 2020-09-12 | End: 2021-04-30

## 2020-11-23 ASSESSMENT — ENCOUNTER SYMPTOMS
NERVOUS/ANXIOUS: 0
SHORTNESS OF BREATH: 0
PARESTHESIAS: 0
DIZZINESS: 0
WEAKNESS: 0
COUGH: 0
FEVER: 0
CHILLS: 0
DIARRHEA: 0
ABDOMINAL PAIN: 0
HEMATOCHEZIA: 0
EYE PAIN: 0
HEADACHES: 0
MYALGIAS: 0
PALPITATIONS: 0
HEARTBURN: 0
FREQUENCY: 0
CONSTIPATION: 0
BREAST MASS: 0
DYSURIA: 0
JOINT SWELLING: 0
SORE THROAT: 0
ARTHRALGIAS: 0
NAUSEA: 0
HEMATURIA: 0

## 2020-11-23 ASSESSMENT — MIFFLIN-ST. JEOR: SCORE: 1626.14

## 2020-11-23 NOTE — LETTER
My Asthma Action Plan    Name: Micheline Camargo   YOB: 1980  Date: 11/23/2020   My doctor: LORI Perez CNP   [facility] clinic: [facility]        My Rescue Medicine:   Albuterol inhaler (Proair/Ventolin/Proventil HFA)  2-4 puffs EVERY 4 HOURS as needed. Use a spacer if recommended by your provider.   My Asthma Severity:   Intermittent / Exercise Induced  Know your asthma triggers: upper respiratory infections             GREEN ZONE   Good Control    I feel good    No cough or wheeze    Can work, sleep and play without asthma symptoms       Take your asthma control medicine every day.     1. If exercise triggers your asthma, take your rescue medication    15 minutes before exercise or sports, and    During exercise if you have asthma symptoms  2. Spacer to use with inhaler: If you have a spacer, make sure to use it with your inhaler             YELLOW ZONE Getting Worse  I have ANY of these:    I do not feel good    Cough or wheeze    Chest feels tight    Wake up at night   1. Keep taking your Green Zone medications  2. Start taking your rescue medicine:    every 20 minutes for up to 1 hour. Then every 4 hours for 24-48 hours.  3. If you stay in the Yellow Zone for more than 12-24 hours, contact your doctor.  4. If you do not return to the Green Zone in 12-24 hours or you get worse, start taking your oral steroid medicine if prescribed by your provider.           RED ZONE Medical Alert - Get Help  I have ANY of these:    I feel awful    Medicine is not helping    Breathing getting harder    Trouble walking or talking    Nose opens wide to breathe       1. Take your rescue medicine NOW  2. If your provider has prescribed an oral steroid medicine, start taking it NOW  3. Call your doctor NOW  4. If you are still in the Red Zone after 20 minutes and you have not reached your doctor:    Take your rescue medicine again and    Call 911 or go to the emergency room right  away    See your regular doctor within 2 weeks of an Emergency Room or Urgent Care visit for follow-up treatment.          Annual Reminders:  Meet with Asthma Educator,  Flu Shot in the Fall, consider Pneumonia Vaccination for patients with asthma (aged 19 and older).    Pharmacy: [id number] - [place], [place] - [address] AT [address] [address] & [facility]    Electronically signed by LORI Perez CNP   Date: 11/23/20                    Asthma Triggers  How To Control Things That Make Your Asthma Worse    Triggers are things that make your asthma worse.  Look at the list below to help you find your triggers and   what you can do about them. You can help prevent asthma flare-ups by staying away from your triggers.      Trigger                                                          What you can do   Cigarette Smoke  Tobacco smoke can make asthma worse. Do not allow smoking in your home, car or around you.  Be sure no one smokes at a child s day care or school.  If you smoke, ask your health care provider for ways to help you quit.  Ask family members to quit too.  Ask your health care provider for a referral to Quit Plan to help you quit smoking, or call 5-627-430-PLAN.     Colds, Flu, Bronchitis  These are common triggers of asthma. Wash your hands often.  Don t touch your eyes, nose or mouth.  Get a flu shot every year.     Dust Mites  These are tiny bugs that live in cloth or carpet. They are too small to see. Wash sheets and blankets in hot water every week.   Encase pillows and mattress in dust mite proof covers.  Avoid having carpet if you can. If you have carpet, vacuum weekly.   Use a dust mask and HEPA vacuum.   Pollen and Outdoor Mold  Some people are allergic to trees, grass, or weed pollen, or molds. Try to keep your windows closed.  Limit time out doors when pollen count is high.   Ask you health care provider about taking medicine during allergy season.     Animal  Dander  Some people are allergic to skin flakes, urine or saliva from pets with fur or feathers. Keep pets with fur or feathers out of your home.    If you can t keep the pet outdoors, then keep the pet out of your bedroom.  Keep the bedroom door closed.  Keep pets off cloth furniture and away from stuffed toys.     Mice, Rats, and Cockroaches  Some people are allergic to the waste from these pests.   Cover food and garbage.  Clean up spills and food crumbs.  Store grease in the refrigerator.   Keep food out of the bedroom.   Indoor Mold  This can be a trigger if your home has high moisture. Fix leaking faucets, pipes, or other sources of water.   Clean moldy surfaces.  Dehumidify basement if it is damp and smelly.   Smoke, Strong Odors, and Sprays  These can reduce air quality. Stay away from strong odors and sprays, such as perfume, powder, hair spray, paints, smoke incense, paint, cleaning products, candles and new carpet.   Exercise or Sports  Some people with asthma have this trigger. Be active!  Ask your doctor about taking medicine before sports or exercise to prevent symptoms.    Warm up for 5-10 minutes before and after sports or exercise.     Other Triggers of Asthma  Cold air:  Cover your nose and mouth with a scarf.  Sometimes laughing or crying can be a trigger.  Some medicines and food can trigger asthma.

## 2020-11-23 NOTE — PROGRESS NOTES
SUBJECTIVE:   CC: Micheline Camargo is an 40 year old woman who presents for preventive health visit.     Patient has been advised of split billing requirements and indicates understanding: Yes  Healthy Habits:     Getting at least 3 servings of Calcium per day:  Yes    Bi-annual eye exam:  Yes    Dental care twice a year:  Yes    Sleep apnea or symptoms of sleep apnea:  None    Diet:  Regular (no restrictions) and Carbohydrate counting    Frequency of exercise:  4-5 days/week    Duration of exercise:  45-60 minutes    Taking medications regularly:  Yes    Medication side effects:  None    PHQ-2 Total Score: 0    Additional concerns today:  No    Concerns today:   ACT & AAP done today    She notices an increase in choking on swallowing, happens every other wk. Never food related, only when swallowing her spit or drinking water. It will lead to coughing fit that may lead to difficulty breathing. No history of reflux, never wakes her up at night.     Weight fluctuates 20# throughout the year. She walks a lot, her diet can change due to carbs. Has seen a nutritionist in the past, her body responds to decreasing carbs.     Wt Readings from Last 4 Encounters:   11/23/20 100.8 kg (222 lb 3.2 oz)   10/16/19 98 kg (216 lb)   11/20/18 98 kg (216 lb)   04/28/17 92.8 kg (204 lb 9.6 oz)       Today's PHQ-2 Score:   PHQ-2 ( 1999 Pf[employer]) 11/22/2020   Q1: Little interest or pleasure in doing things 0   Q2: Feeling down, depressed or hopeless 0   PHQ-2 Score 0   Q1: Little interest or pleasure in doing things Not at all   Q2: Feeling down, depressed or hopeless Not at all   PHQ-2 Score 0     Abuse: Current or Past (Physical, Sexual or Emotional) - No  Do you feel safe in your environment? Yes    Social History     Tobacco Use     Smoking status: Never Smoker     Smokeless tobacco: Never Used   Substance Use Topics     Alcohol use: Yes     Alcohol/week: 1.7 standard drinks     Types: 2 Glasses of wine per week     Alcohol Use  11/22/2020   Prescreen: >3 drinks/day or >7 drinks/week? No   Prescreen: >3 drinks/day or >7 drinks/week? -     Reviewed orders with patient.  Reviewed health maintenance and updated orders accordingly - Yes  Lab work is in process    Mammogram Screening: Patient under age 50, mutual decision reflected in health maintenance.      Pertinent mammograms are reviewed under the imaging tab.  History of abnormal Pap smear: NO - age 30-65 PAP every 5 years with negative HPV co-testing recommended  PAP / HPV Latest Ref Rng & Units 4/28/2017 6/20/2014 8/10/2011   PAP - NIL NIL NIL   HPV 16 DNA NEG Negative - -   HPV 18 DNA NEG Negative - -   OTHER HR HPV NEG Negative - -     Reviewed and updated as needed this visit by clinical staff  Tobacco  Allergies  Meds   Med Hx  Surg Hx  Fam Hx  Soc Hx        Reviewed and updated as needed this visit by Provider    Meds                 Review of Systems   Constitutional: Negative for chills and fever.   HENT: Negative for congestion, ear pain, hearing loss and sore throat.    Eyes: Negative for pain and visual disturbance.   Respiratory: Negative for cough and shortness of breath.    Cardiovascular: Negative for chest pain, palpitations and peripheral edema.   Gastrointestinal: Negative for abdominal pain, constipation, diarrhea, heartburn, hematochezia and nausea.   Breasts:  Negative for tenderness, breast mass and discharge.   Genitourinary: Negative for dysuria, frequency, genital sores, hematuria, pelvic pain, urgency, vaginal bleeding and vaginal discharge.   Musculoskeletal: Negative for arthralgias, joint swelling and myalgias.   Skin: Negative for rash.   Neurological: Negative for dizziness, weakness, headaches and paresthesias.   Psychiatric/Behavioral: Negative for mood changes. The patient is not nervous/anxious.         OBJECTIVE:   /68 (BP Location: Right arm, Cuff Size: Adult Large)   Pulse 80   Temp 98.5  F (36.9  C) (Tympanic)   Resp 14   Ht 1.575 m  "(5' 2\")   Wt 100.8 kg (222 lb 3.2 oz)   SpO2 97%   BMI 40.64 kg/m    Physical Exam  GENERAL: healthy, alert and no distress  EYES: Eyes grossly normal to inspection, PERRL and conjunctivae and sclerae normal  HENT: ear canals and TM's normal, nose and mouth without ulcers or lesions  NECK: no adenopathy, no asymmetry, masses, or scars and thyroid normal to palpation  RESP: lungs clear to auscultation - no rales, rhonchi or wheezes  CV: regular rate and rhythm, normal S1 S2, no S3 or S4, no murmur, click or rub, no peripheral edema and peripheral pulses strong  ABDOMEN: soft, nontender, no hepatosplenomegaly, no masses and bowel sounds normal   (female): normal female external genitalia, normal urethral meatus, vaginal mucosa, normal cervix/adnexa/uterus without masses or discharge  MS: no gross musculoskeletal defects noted, no edema  SKIN: no suspicious lesions or rashes  PSYCH: mentation appears normal, affect normal/bright      ASSESSMENT/PLAN:   1. Routine general medical examination at a health care facility    - Lipid panel reflex to direct LDL Fasting; Future  - **Glucose FUTURE anytime; Future    2. Need for hepatitis C screening test    - Hepatitis C Screen Reflex to HCV RNA Quant and Genotype; Future    3. Mild intermittent asthma, unspecified whether complicated  stable  - albuterol (PROAIR HFA/PROVENTIL HFA/VENTOLIN HFA) 108 (90 Base) MCG/ACT inhaler; Inhale 2 puffs into the lungs every 6 hours as needed for shortness of breath / dyspnea or wheezing  Dispense: 6.7 g; Refill: 1    4. Family history of ischemic heart disease      5. Morbid obesity (H)  Diet and exercise reviewed, she notes she tends to lose weight with decreasing carbs, willing to review with wt mgmt to discuss potential medications.   - COMPREHENSIVE WEIGHT MANAGEMENT  - **Glucose FUTURE anytime; Future    6. Dysphagia, unspecified type  New symptoms for her, no red flags such as difficulty breathing, night time symptoms, difficulty " "eating. Will refer to ENT for further eval  - OTOLARYNGOLOGY REFERRAL    Patient has been advised of split billing requirements and indicates understanding: Yes  COUNSELING:  Reviewed preventive health counseling, as reflected in patient instructions  Special attention given to:        Regular exercise       Healthy diet/nutrition       Alcohol Use       Contraception       Osteoporosis prevention/bone health    Estimated body mass index is 40.64 kg/m  as calculated from the following:    Height as of this encounter: 1.575 m (5' 2\").    Weight as of this encounter: 100.8 kg (222 lb 3.2 oz).    Weight management plan: Patient referred to endocrine and/or weight management specialty    She reports that she has never smoked. She has never used smokeless tobacco.      Counseling Resources:  ATP IV Guidelines  Pooled Cohorts Equation Calculator  Breast Cancer Risk Calculator  BRCA-Related Cancer Risk Assessment: FHS-7 Tool  FRAX Risk Assessment  ICSI Preventive Guidelines  Dietary Guidelines for Americans, 2010  USDA's MyPlate  ASA Prophylaxis  Lung CA Screening    LORI Perez [facility] KEEGAN  "

## 2020-11-24 ASSESSMENT — ASTHMA QUESTIONNAIRES: ACT_TOTALSCORE: 22

## 2020-11-30 ENCOUNTER — VIRTUAL VISIT (OUTPATIENT)
Dept: SURGERY | Facility: CLINIC | Age: 40
End: 2020-11-30
Attending: NURSE PRACTITIONER
Payer: COMMERCIAL

## 2020-11-30 VITALS — WEIGHT: 217 LBS | HEIGHT: 62 IN | BODY MASS INDEX: 39.93 KG/M2

## 2020-11-30 DIAGNOSIS — E66.01 MORBID OBESITY (H): Primary | ICD-10-CM

## 2020-11-30 DIAGNOSIS — J45.909 UNCOMPLICATED ASTHMA, UNSPECIFIED ASTHMA SEVERITY, UNSPECIFIED WHETHER PERSISTENT: ICD-10-CM

## 2020-11-30 PROCEDURE — 99443 PR PHYSICIAN TELEPHONE EVALUATION 21-30 MIN: CPT | Mod: 95 | Performed by: FAMILY MEDICINE

## 2020-11-30 RX ORDER — METFORMIN HCL 500 MG
TABLET, EXTENDED RELEASE 24 HR ORAL
Qty: 90 TABLET | Refills: 1 | Status: SHIPPED | OUTPATIENT
Start: 2020-11-30 | End: 2020-12-27

## 2020-11-30 ASSESSMENT — MIFFLIN-ST. JEOR: SCORE: 1602.56

## 2020-11-30 NOTE — ASSESSMENT & PLAN NOTE
We discussed healthy habits to assist with weight loss. She will keep a food journal and present it at her visit with her company's dietician. She will add some resistance training and continue her walking. She struggles with an unhealthy relationship with food- either on track or off track- she may benefit from health coaching or counseling. We discussed medication that may assist with weight loss, including phentermine, topamax, contrave and metformin. We discussed insulin resistance and how it can interfere with weight loss efforts. We discussed dietary changes and exercise to reduce insulin resistance. We discussed the use of metformin. Patient had her questions answered. Risks/ benefits and possible side effects were discussed and questions were answered. Written information was given. I ordered an A1C and a TSH. She doesn't sleep well with caffeine so we may want to avoid phentermine or other stimulants.

## 2020-11-30 NOTE — PROGRESS NOTES
"    Micheline Camargo is a 40 year old female who is being evaluated via a billable telephone visit.      The patient has been notified of following:     \"This telephone visit will be conducted via a call between you and your physician/provider. We have found that certain health care needs can be provided without the need for a physical exam.  This service lets us provide the care you need with a short phone conversation.  If a prescription is necessary we can send it directly to your pharmacy.  If lab work is needed we can place an order for that and you can then stop by our lab to have the test done at a later time.    Telephone visits are billed at different rates depending on your insurance coverage. During this emergency period, for some insurers they may be billed the same as an in-person visit.  Please reach out to your insurance provider with any questions.    If during the course of the call the physician/provider feels a telephone visit is not appropriate, you will not be charged for this service.\"    Patient has given verbal consent for Telephone visit?  Yes    What phone number would you like to be contacted at? [phone]    How would you like to obtain your AVS? MyChart    Phone call duration: 60 minutes    CAIN Power MD        New Medical Weight Management Consult    PATIENT:  Micheline Camargo  MRN:         [id number]  :         1980  NARENDRA:         2020    Dear Mariposa SANDOVAL CNP,    I had the pleasure of seeing your patient, Micheline Camargo. Full intake/assessment was done to determine barriers to weight loss success and develop a treatment plan. Micheline Camargo is a 40 year old female interested in treatment of medical problems associated with excess weight. She has a height of 5' 2\"[pt reported[, a weight of 217 lbs 0 oz, and the calculated Body mass index is 39.69 kg/m .    ASSESSMENT/PLAN:  Morbid obesity (H)  We discussed healthy habits to assist with weight loss. " "She will keep a food journal and present it at her visit with her company's dietician. She will add some resistance training and continue her walking. She struggles with an unhealthy relationship with food- either on track or off track- she may benefit from health coaching or counseling. We discussed medication that may assist with weight loss, including phentermine, topamax, contrave and metformin. We discussed insulin resistance and how it can interfere with weight loss efforts. We discussed dietary changes and exercise to reduce insulin resistance. We discussed the use of metformin. Patient had her questions answered. Risks/ benefits and possible side effects were discussed and questions were answered. Written information was given. I ordered an A1C and a TSH. She doesn't sleep well with caffeine so we may want to avoid phentermine or other stimulants.      Uncomplicated asthma, unspecified asthma severity, unspecified whether persistent  This may improve with healthy habits and weight loss.      She has the following co-morbidities:       11/24/2020   I have the following health issues associated with obesity: Asthma   I have the following symptoms associated with obesity: None of the above       Patient Goals 11/24/2020   I am interested in having a healthier weight to diminish current health problems: No   I am interested in having a healthier weight in order to prevent future health problems: Yes   I am interested in having a healthier weight in order to have a future surgery: No       Referring Provider 11/24/2020   Please name the provider who referred you to Medical Weight Management.  If you do not know, please answer: \"I Don't Know\". Mariposa Hyatt       Weight History 11/24/2020   How concerned are you about your weight? Somewhat Concerned   Would you describe your weight gain as gradual? Yes   I became overweight: As a Child   The following factors have contributed to my weight gain:  Eating " Too Much, Genetic (Runs in the Family)   I have tried the following methods to lose weight: Watching Portions or Calories, Exercise, Weight Watchers, Atkins-type Diet (Low Carb/High Protein), Slim Fast or Other Liquid Diets, Meal Replacements, Fasting   My lowest weight since age 17 was: 130   My highest weight since age 17 was: 222   The most weight I have ever lost was: (lbs) 20   I have the following family history of obesity/being overweight:  My mother is overweight, My father is overweight   Has anyone in your family had weight loss surgery? No   How has your weight changed over the last year?  Gained   How many pounds? 20     Patient states that when she is motivated she does really well. When she is not motivated she does poorly. She keeps losing and gaining the same 20-40 #. It has been much harder for her with Covid. She is an emotional eater.    Diet Recall Review with Patient 11/24/2020   Do you typically eat breakfast? Yes   If you do eat breakfast, what types of food do you eat? Protein shake, homemade.  Protein bar   Do you typically eat lunch? Yes   If you do eat lunch, what types of food do you typically eat?  String cheese, cut up deli meat, crackers.  Or soup with crackers.   Do you typically eat supper? Yes   If you do eat supper, what types of food do you typically eat? We do hello fresh meals twice per week, we also do tacos, one pasta meal, eat out (or take out) a few times   Do you typically eat snacks? Yes   If you do snack, what types of food do you typically eat? Depends...could be a mini protein bar, could be pretzels, could be a cookie   Do you like vegetables?  Yes   Do you drink water? Yes   How many glasses of juice do you drink in a typical day? 0   How many of glasses of milk do you drink in a typical day? 0   If you do drink milk, what type? 1%   How many 8oz glasses of sugar containing drinks such as Nima-Aid/sweet tea do you drink in a day? 0   How many cans/bottles of sugar  pop/soda/tea/sports drinks do you drink in a day? 0   How many cans/bottles of diet pop/soda/tea or sports drink do you drink in a day? 1   How often do you have a drink of alcohol? 2-4 Times a Month   If you do drink, how many drinks might you have in a day? 3-4       Eating Habits 11/24/2020   Generally, my meals include foods like these: bread, pasta, rice, potatoes, corn, crackers, sweet dessert, pop, or juice. Almost Everyday   Generally, my meals include foods like these: fried meats, brats, burgers, french fries, pizza, cheese, chips, or ice cream. A Few Times a Week   Eat fast food (like [employer], [employer], [employer]). Never   Eat at a buffet or sit-down restaurant. Once a Week   Eat most of my meals in front of the TV or computer. Never   Often skip meals, eat at random times, have no regular eating times. A Few Times a Week   Rarely sit down for a meal but snack or graze throughout.  Less Than Weekly   Eat extra snacks between meals. Almost Everyday   Eat most of my food at the end of the day. Less Than Weekly   Eat in the middle of the night or wake up at night to eat. Never   Eat extra snacks to prevent or correct low blood sugar. Never   Eat to prevent acid reflux or stomach pain. Never   Worry about not having enough food to eat. Never   Have you been to the food shelf at least a few times this year? No   I eat when I am depressed. Less Than Weekly   I eat when I am stressed. Less Than Weekly   I eat when I am bored. A Few Times a Week   I eat when I am anxious. Less Than Weekly   I eat when I am happy or as a reward. A Few Times a Week   I feel hungry all the time even if I just have eaten. Never   Feeling full is important to me. Never   I finish all the food on my plate even if I am already full. Less Than Weekly   I can't resist eating delicious food or walk past the good food/smell. Less Than Weekly   I eat/snack without noticing that I am eating. Less Than Weekly   I eat when I am preparing  the meal. Once a Week   I eat more than usual when I see others eating. Less Than Weekly   I have trouble not eating sweets, ice cream, cookies, or chips if they are around the house. Once a Week   I think about food all day. Never   What foods, if any, do you crave? Sweets/Candy/Chocolate       Amount of Food 11/24/2020   I make myself vomit what I have eaten or use laxatives to get rid of food. Never   I eat a large amount of food, like a loaf of bread, a box of cookies, a pint/quart of ice cream, all at once. Never   I eat a large amount of food even when I am not hungry. Never   I eat rapidly. Never   I eat alone because I feel embarrassed and do not want others to see how much I have eaten. Never   I eat until I am uncomfortably full. Monthly   I feel bad, disgusted, or guilty after I overeat. Weekly   I make myself vomit what I have eaten or use laxatives to get rid of food. Never       Activity/Exercise History 11/24/2020   How much of a typical 12 hour day do you spend sitting? Most of the Day   How much of a typical 12 hour day do you spend lying down? Less Than Half the Day   How much of a typical day do you spend walking/standing? Less Than Half the Day   How many hours (not including work) do you spend on the TV/Video Games/Computer/Tablet/Phone? 1 Hour or Less   How many times a week are you active for the purpose of exercise? 6-7 Times a Week   What keeps you from being more active? Lack of Time   How many total minutes do you spend doing some activity for the purpose of exercising when you exercise? More Than 30 Minutes     Walks: [id number] steps per day    PAST MEDICAL HISTORY:  Past Medical History:   Diagnosis Date     Asthma        Work/Social History Reviewed With Patient 11/24/2020   My employment status is: Full-Time   My job is:  at [employer]   How much of your job is spent on the computer or phone? 100%   How many hours do you spend commuting to work daily?  0   What  is your marital status? /In a Relationship   If in a relationship, is your significant other overweight? No   Do you have children? Yes   If you have children, are they overweight? Yes   Who do you live with?   and our daughter (10 years old)   Are they supportive of your health goals? Yes   Who does the food shopping?  me       Mental Health History Reviewed With Patient 11/24/2020   Have you ever been physically or sexually abused? No   If yes, do you feel that the abuse is affecting your weight? No   If yes, would you like to talk to a counselor about the abuse? No   How often in the past 2 weeks have you felt little interest or pleasure in doing things? Not at all   Over the past 2 weeks how often have you felt down, depressed, or hopeless? Not at all       Sleep History Reviewed With Patient 11/24/2020   How many hours do you sleep at night? 7   Do you think that you snore loudly or has anybody ever heard you snore loudly (louder than talking or so loud it can be heard behind a shut door)? Yes   Has anyone seen or heard you stop breathing during your sleep? Yes   Do you often feel tired, fatigued, or sleepy during the day? Yes   Do you have a TV/Computer in your bedroom? No       MEDICATIONS:   Current Outpatient Medications   Medication Sig Dispense Refill     albuterol (PROAIR HFA/PROVENTIL HFA/VENTOLIN HFA) 108 (90 Base) MCG/ACT inhaler Inhale 2 puffs into the lungs every 6 hours as needed for shortness of breath / dyspnea or wheezing 6.7 g 1     FLUARIX QUADRIVALENT 0.5 ML injection        loratadine (CLARITIN) 10 MG tablet Take 1 tablet (10 mg) by mouth daily 30 tablet 1       ALLERGIES:   No Known Allergies    ROS:    General  Fatigue: yes  HEENT  Hx of glaucoma: no  Vision changes: no  Cardiovascular  Chest Pain with Exertion: no  Palpitations: no  Hx of heart disease: no  Pulmonary  Shortness of breath at rest: no  Shortness of breath with exertion: sometimes  Stop-bang score: not  done  Latimer Score: not done  Gastrointestinal  Heartburn: rare  Psychiatric  Moods Stable: yes  Neurologic:  Hx of seizures: no  :  Birth control: IUD and 's vasectomy    PHYSICAL EXAM:  GENERAL: Healthy, alert and no distress  PSYCH: Mentation appears normal, affect normal/bright, judgement and insight intact, normal speech    LABS: added TSH and A1C    Lab Results   Component Value Date    WBC 10.0 11/20/2018    HGB 14.4 11/20/2018    HCT 43.2 11/20/2018    MCV 89 11/20/2018     11/20/2018     Lab Results   Component Value Date    CHOL 125 06/25/2014    CHOL 139 08/10/2011     Lab Results   Component Value Date    HDL 43 (L) 06/25/2014    HDL 44 (L) 08/10/2011     No components found for: LDLCALC  Lab Results   Component Value Date    TRIG 79 06/25/2014    TRIG 93 08/10/2011     No components found for: CHOLHDL  Lab Results   Component Value Date    ALT 27 06/25/2014    AST 31 06/25/2014    ALKPHOS 82 06/25/2014     No components found for: HGBA1C  No components found for: [id number]       FOLLOW-UP:   In 2-3 months with myself. She will be meeting with a dietician that is provided through her company.    TIME: 60 min spent on evaluation, management, counseling, education, & motivational interviewing with greater than 50 % of the total time was spent on counseling and coordinating care    Sincerely,    CAIN Power MD

## 2020-11-30 NOTE — PATIENT INSTRUCTIONS
Eat Better ? Move More ? Live Well    Eat 3 nutrient-rich meals each day     Don t skip meals--it will cause you to overeat later in the day!     Eating fiber (vegetables/fruits/whole grains) and protein with meals helps you stay full longer     Choose foods with less than 10 grams of sugar and 5 grams of fat per serving to prevent excess calories and weight re-gain   Eat around the same times each day to develop a routine eating schedule    Avoid snacking unless physically hungry.   Planned snacks: 1-2 times per day and no more than 150 calories    Eat protein first    Protein helps with healing, maintaining adequate muscle mass, reducing hunger and optimizing nutritional status    Aim for 60-80 grams of protein per day, 20 grams per meal (3) plus one 10 gram protein snack  Fill up on Fiber    Fiber comes from plants--fruits, veggies, whole grains, nuts/seeds and beans    Fiber is low in calories, high in phytonutrients and helps you stay full longer    Aim for 25-35 grams per day by eating fiber with meals and snacks  Eat S-L-O-W-L-Y    Take 20-30 minutes to eat each meal by taking small bites, chewing foods to applesauce consistency or 20-30 times before you swallow    Eating foods too fast can delay satiety/fullness signals and increase overeating   ? Slow down your eating by using toddler utensils, putting your fork/spoon down between bites and not watching TV or emailing during meals!   Keep a Journal          Writing down what you eat, how you feel and when you are active helps you identify new changes to work on from week to week          Look for ways to cut 100 calories from your current diet 2-3 times per day  Drink 64 ounces of 0-Calorie drinks between meals    Water    Zero calorie Propel  or Vitamin Water      SoBe Lifewater  Zero Calories    Crystal Light , Sugar-Free Nima-Aid , and other sugar-free lemonade or flavored bazzi    Keep Caffeine to less than 300mg per day ie: 3-6oz cups coffee     Work  up to 45-60 minutes of physical activity most days of the week    Helps with losing weight and prevent regaining those extra pounds!     Do a combo of cardio (walking/water exercises) and strength training (lifting weights/Vinyasa yoga)    Avoid Mindless Eating    Be present when you eat--take note of the smell, taste and quality of your food    Make a list of alternative activities you could do to prevent eating out of boredom/stress  ? Go for a walk, call a friend, chew gum, paint your nails, re-organize the garage, etc      LEAN PROTEIN SOURCES      Protein Source Portion Calories Grams of Protein                           Nonfat, plain Greek yogurt    (10 grams sugar or less) 3/4 cup (6 oz)  12-17   Light Yogurt (10 grams sugar or less) 3/4 cup (6 oz)  6-8   Protein Shake 1 shake 110-180 15-30   Skim/1% Milk or lactose-free milk 1 cup ( 8 oz)  8   Plain or light, flavored soymilk 1 cup  7-8   Plain or light, hemp milk 1 cup 110 6   Fat Free or 1% Cottage Cheese 1/2 cup 90 15   Part skim ricotta cheese 1/2 cup 100 14   Part skim or reduced fat cheese slices 1 ounce 65-80 8     Mozzarella String Cheese 1 80 8   Canned tuna, chicken, crab or salmon  (canned in water)  1/2 cup 100 15-20   White fish (broiled, grilled, baked) 3 ounces 100 21   [place]/Tuna (broiled, grilled, baked) 3 ounces 150-180 21   Shrimp, Scallops, Lobster, Crab 3 ounces 100 21   Pork loin, Pork Tenderloin 3 ounces 150 21   Boneless, skinless chicken /turkey breast                          (broiled, grilled, baked) 3 ounces 120 21   [place], [place], [place], and Venison 3 ounces 120 21   Lean cuts of red meat and pork (sirloin,   round, tenderloin, flank, ground 93%-96%) 3 ounces 170 21   Lean or Extra Lean Ground Turkey 1/2 cup 150 20   90-95% Lean [place] Burger 1 sheryl 140-180 21   Low-fat casserole with lean meat 3/4 cup 200 17   Luncheon Meats                                                        (turkey, lean ham, roast  beef, chicken) 3 ounces 100 21   Egg (boiled, poached, scrambled) 1 Egg 60 7   Egg Substitute 1/2 cup 70 10   Nuts (limit to 1 serving per day)  3 Tbsp. 150 7   Nut [place] (peanut, almond)  Limit to 1 serving or less daily 1 Tbsp. 90 4   Soy Burger (varies) 1  15   Garbanzo, Black, [place]ovar Beans 1/2 cup 110 7   Refried Beans 1/2 cup 100 7   Kidney and Lima beans 1/2 cup 110 7   Tempeh 3 oz 175 18   Vegan crumbles 1/2 cup 100 14   Tofu 1/2 cup 110 14   Chili (beans and extra lean beef or turkey) 1 cup 200 23   Lentil Stew/Soup 1 cup 150 12   Black Bean Soup 1 cup 175 12

## 2020-12-09 DIAGNOSIS — Z00.00 ROUTINE GENERAL MEDICAL EXAMINATION AT A HEALTH CARE FACILITY: ICD-10-CM

## 2020-12-09 DIAGNOSIS — E66.01 MORBID OBESITY (H): ICD-10-CM

## 2020-12-09 DIAGNOSIS — Z11.59 NEED FOR HEPATITIS C SCREENING TEST: ICD-10-CM

## 2020-12-09 LAB
CHOLEST SERPL-MCNC: 153 MG/DL
GLUCOSE SERPL-MCNC: 87 MG/DL (ref 70–99)
HCV AB SERPL QL IA: NONREACTIVE
HDLC SERPL-MCNC: 46 MG/DL
LDLC SERPL CALC-MCNC: 89 MG/DL
NONHDLC SERPL-MCNC: 107 MG/DL
TRIGL SERPL-MCNC: 89 MG/DL

## 2020-12-09 PROCEDURE — 86803 HEPATITIS C AB TEST: CPT | Performed by: NURSE PRACTITIONER

## 2020-12-09 PROCEDURE — 36415 COLL VENOUS BLD VENIPUNCTURE: CPT | Performed by: NURSE PRACTITIONER

## 2020-12-09 PROCEDURE — 80061 LIPID PANEL: CPT | Performed by: NURSE PRACTITIONER

## 2020-12-09 PROCEDURE — 82947 ASSAY GLUCOSE BLOOD QUANT: CPT | Performed by: NURSE PRACTITIONER

## 2020-12-27 DIAGNOSIS — E66.01 MORBID OBESITY (H): ICD-10-CM

## 2020-12-27 RX ORDER — METFORMIN HCL 500 MG
TABLET, EXTENDED RELEASE 24 HR ORAL
Qty: 180 TABLET | Refills: 1 | Status: SHIPPED | OUTPATIENT
Start: 2020-12-27 | End: 2021-03-29

## 2020-12-29 DIAGNOSIS — E66.01 MORBID OBESITY (H): ICD-10-CM

## 2020-12-29 LAB
HBA1C MFR BLD: 5.4 % (ref 0–5.6)
TSH SERPL DL<=0.005 MIU/L-ACNC: 4.88 MU/L (ref 0.4–4)

## 2020-12-29 PROCEDURE — 36415 COLL VENOUS BLD VENIPUNCTURE: CPT | Performed by: FAMILY MEDICINE

## 2020-12-29 PROCEDURE — 83036 HEMOGLOBIN GLYCOSYLATED A1C: CPT | Performed by: FAMILY MEDICINE

## 2020-12-29 PROCEDURE — 84443 ASSAY THYROID STIM HORMONE: CPT | Performed by: FAMILY MEDICINE

## 2020-12-31 DIAGNOSIS — R79.89 ELEVATED TSH: Primary | ICD-10-CM

## 2021-01-22 DIAGNOSIS — R79.89 ELEVATED TSH: ICD-10-CM

## 2021-01-22 PROCEDURE — 84439 ASSAY OF FREE THYROXINE: CPT | Performed by: FAMILY MEDICINE

## 2021-01-22 PROCEDURE — 36415 COLL VENOUS BLD VENIPUNCTURE: CPT | Performed by: FAMILY MEDICINE

## 2021-01-23 LAB — T4 FREE SERPL-MCNC: 1.01 NG/DL (ref 0.76–1.46)

## 2021-03-29 ENCOUNTER — MYC MEDICAL ADVICE (OUTPATIENT)
Dept: SURGERY | Facility: CLINIC | Age: 41
End: 2021-03-29

## 2021-03-29 DIAGNOSIS — E66.01 MORBID OBESITY (H): ICD-10-CM

## 2021-03-29 NOTE — TELEPHONE ENCOUNTER
Patient last seen 11/30/2020.  Was to RTC in 2-3 months.     Has appointment for 4/8/21 with Dr. Power.    Will pend and forward refill request to Dr. Power.    Misty Lucas, MS, RD, RN

## 2021-03-30 RX ORDER — METFORMIN HCL 500 MG
TABLET, EXTENDED RELEASE 24 HR ORAL
Qty: 180 TABLET | Refills: 1 | Status: SHIPPED | OUTPATIENT
Start: 2021-03-30 | End: 2021-09-08

## 2021-04-08 ENCOUNTER — VIRTUAL VISIT (OUTPATIENT)
Dept: SURGERY | Facility: CLINIC | Age: 41
End: 2021-04-08
Payer: COMMERCIAL

## 2021-04-08 VITALS — BODY MASS INDEX: 37.86 KG/M2 | WEIGHT: 207 LBS

## 2021-04-08 DIAGNOSIS — E66.09 CLASS 2 OBESITY DUE TO EXCESS CALORIES WITH BODY MASS INDEX (BMI) OF 39.0 TO 39.9 IN ADULT, UNSPECIFIED WHETHER SERIOUS COMORBIDITY PRESENT: Primary | ICD-10-CM

## 2021-04-08 DIAGNOSIS — E66.812 CLASS 2 OBESITY DUE TO EXCESS CALORIES WITH BODY MASS INDEX (BMI) OF 39.0 TO 39.9 IN ADULT, UNSPECIFIED WHETHER SERIOUS COMORBIDITY PRESENT: Primary | ICD-10-CM

## 2021-04-08 DIAGNOSIS — J45.909 UNCOMPLICATED ASTHMA, UNSPECIFIED ASTHMA SEVERITY, UNSPECIFIED WHETHER PERSISTENT: ICD-10-CM

## 2021-04-08 PROCEDURE — 99203 OFFICE O/P NEW LOW 30 MIN: CPT | Mod: 95 | Performed by: FAMILY MEDICINE

## 2021-04-08 NOTE — PATIENT INSTRUCTIONS
Gabriel Comer,  It was nice talking to you today. Please call [phone] to set up a follow up appointment with me in early July.   LynnEat Better ? Move More ? Live Well    Eat 3 nutrient-rich meals each day     Don t skip meals--it will cause you to overeat later in the day!     Eating fiber (vegetables/fruits/whole grains) and protein with meals helps you stay full longer     Choose foods with less than 10 grams of sugar and 5 grams of fat per serving to prevent excess calories and weight re-gain   Eat around the same times each day to develop a routine eating schedule    Avoid snacking unless physically hungry.   Planned snacks: 1-2 times per day and no more than 150 calories    Eat protein first    Protein helps with healing, maintaining adequate muscle mass, reducing hunger and optimizing nutritional status    Aim for 60-80 grams of protein per day   Fill up on Fiber    Fiber comes from plants--fruits, veggies, whole grains, nuts/seeds and beans    Fiber is low in calories, high in phytonutrients and helps you stay full longer    Aim for 25-35 grams per day by eating fiber with meals and snacks  Eat S-L-O-W-L-Y    Take 20-30 minutes to eat each meal by taking small bites, chewing foods to applesauce consistency or 20-30 times before you swallow    Eating foods too fast can delay satiety/fullness signals and increase overeating   ? Slow down your eating by using toddler utensils, putting your fork/spoon down between bites and not watching TV or emailing during meals!   Keep a Journal          Writing down what you eat, how you feel and when you are active helps you identify new changes to work on from week to week          Look for ways to cut 100 calories from your current diet 2-3 times per day  Drink 64 ounces of 0-Calorie drinks between meals    Water    Zero calorie Propel  or Vitamin Water      SoBe Lifewater  Zero Calories    Crystal Light , Sugar-Free Nima-Aid , and other sugar-free lemonade or flavored  bazzi    Keep Caffeine to less than 300mg per day ie: 3-6oz cups coffee     Work up to 45-60 minutes of physical activity most days of the week    Helps with losing weight and prevent regaining those extra pounds!     Do a combo of cardio (walking/water exercises) and strength training (lifting weights/Vinyasa yoga)    Avoid Mindless Eating    Be present when you eat--take note of the smell, taste and quality of your food    Make a list of alternative activities you could do to prevent eating out of boredom/stress  ? Go for a walk, call a friend, chew gum, paint your nails, re-organize the garage, etc

## 2021-04-08 NOTE — PROGRESS NOTES
Nahomi is a 40 year old who is being evaluated via a billable telephone visit.      What phone number would you like to be contacted at? [phone]    How would you like to obtain your AVS? [place]        Phone call duration: 25 minutes    [facility] Surgical Follow up Visit   Date of visit: 4/8/2021  Physician: CAIN Power MD, MD  Primary Care is Alex Mantillaistina ANASTASIA Camargo   40 year old  female     Initial Weight: 217#  Initial BMI: 39.69  Today's Weight:   Wt Readings from Last 1 Encounters:   11/30/20 98.4 kg (217 lb)     There is no height or weight on file to calculate BMI.           Assessment and Plan   Assessment: Micheline is a 40 year old year old female who presents for medical weight management.      Plan:     Diagnosis Comments   1. Class 2 obesity due to excess calories with body mass index (BMI) of 39.0 to 39.9 in adult, unspecified whether serious comorbidity present  Patient was congratulated on her success thus far. Healthy habits to assist with further weight loss were discussed. She will work on getting adequate water on the weekends and will try adding some resistance training. She will continue the metformin.   2. Uncomplicated asthma, unspecified asthma severity, unspecified whether persistent  This may improve with healthy habits and weight loss.       Follow up in 3 months with myself           INTERIM HISTORY  Patient is frustrated that she hasn't lost more weight. She has noticed that her clothes are fitting better. She thinks her energy is better. Her appetite is suppressed somewhat.     DIETARY HISTORY  Meals Per Day: 3  Eating Protein First?: yes  Fluid Intake: 64 oz during the week, sometimes lacking on the weekends  Portion Control: improved  Calorie Containing Beverages: rare  Meals at Restaurant per week:more when she traveled for Spring break    Positive Changes Since Last Visit: increased protein and less sugar  Struggling With: emotional  eating    Knowledgeable in Reading Food Labels: yes  Getting Adequate Protein: yes  Sleeping 7-8 hours/day sometimes  Stress management walking    PHYSICAL ACTIVITY PATTERNS:  Cardiovascular: [id number] steps per day  Strength Training: none    REVIEW OF SYSTEMS  GENERAL/CONSTITUTIONAL:  Fatigue: yes  HEENT:  Vision changes, glaucoma: no  CARDIOVASCULAR:  Chest Pain with Exertion: no  PULMONARY:  Dyspnea on exertion: no  NEUROLOGIC:  Paresthesias: no  PSYCHIATRIC:  Moods: stable  MUSCULOSKELETAL/RHEUMATOLOGIC  Arthralgias: no  Myalgias: no  ENDOCRINE:  Monitoring Blood Sugars: na  Sugars Well Controlled: na       Patient Profile   Social History     Social History Narrative     Not on file        Past Medical History   Past Medical History:   Diagnosis Date     Asthma      Patient Active Problem List   Diagnosis     CARDIOVASCULAR SCREENING; LDL GOAL LESS THAN 160     IUD (intrauterine device) in place     Exacerbation of asthma     Family history of ischemic heart disease     Morbid obesity (H)     Uncomplicated asthma, unspecified asthma severity, unspecified whether persistent       Past Surgical History  She has a past surgical history that includes tonsillectomy (age 8); TOOTH EXTRACTION W/FORCEP; and IUD,MIRENA (2010, 6/17/15).     Examination   There were no vitals taken for this visit.  General:  Alert, NAD  Pscyh/Mood: stable       Counseling:   We reviewed the important post op bariatric recommendations:  -eating 3 meals daily  -eating protein first, getting >60gm protein daily  -eating slowly, chewing food well  -avoiding/limiting calorie containing beverages  -limiting starchy vegetables and carbohydrates, choosing wheat, not white with breads,   crackers, pastas, juan, bagels, tortillas, rice  -limiting restaurant or cafeteria eating to twice a week or less    We discussed the importance of restorative sleep and stress management in maintaining a healthy weight.  We discussed the National  Weight Control Registry healthy weight maintenance strategies and ways to optimize metabolism.  We discussed the importance of physical activity including cardiovascular and strength training in maintaining a healthier weight.    Total time spent on the date of this encounter doing: chart review, review of test results, patient visit, physical exam, education, counseling, developing plan of care and documenting = 31 minutes.         CAIN Power MD  [facility]

## 2021-04-30 ENCOUNTER — VIRTUAL VISIT (OUTPATIENT)
Dept: SLEEP MEDICINE | Facility: CLINIC | Age: 41
End: 2021-04-30
Attending: FAMILY MEDICINE
Payer: COMMERCIAL

## 2021-04-30 VITALS — WEIGHT: 200 LBS | HEIGHT: 62 IN | BODY MASS INDEX: 36.8 KG/M2

## 2021-04-30 DIAGNOSIS — E66.01 MORBID OBESITY (H): ICD-10-CM

## 2021-04-30 DIAGNOSIS — R06.89 GASPING FOR BREATH: Primary | ICD-10-CM

## 2021-04-30 DIAGNOSIS — R06.83 SNORING: ICD-10-CM

## 2021-04-30 PROCEDURE — 99204 OFFICE O/P NEW MOD 45 MIN: CPT | Mod: 95 | Performed by: PHYSICIAN ASSISTANT

## 2021-04-30 ASSESSMENT — MIFFLIN-ST. JEOR: SCORE: 1525.44

## 2021-04-30 NOTE — PROGRESS NOTES
Nahomi is a 40 year old who is being evaluated via a billable video visit.      How would you like to obtain your AVS? MyChart  If the video visit is dropped, the invitation should be resent by: Send to e-mail at: [email or web address]  Will anyone else be joining your video visit? No      Keily Kendall, GEM on 4/30/2021 at 10:28 AM    Video Start Time: 11:10AM   [facility]   Outpatient Sleep Medicine Consultation  Apr 30, 2021       Name: Micheline Camargo MRN# [id number]   Age: 40 year old YOB: 1980     Date of Consultation: Apr 30, 2021   Consultation is requested by: JEWELL Power MD  [facility]  [address] [address] JEWELL Power  Primary care provider: Alex Mantilla         Assessment and Plan:   1. Gasping for breath in sleep  2. Snoring  3. Morbid obesity (H)    Patient is being evaluated for Obstructive Sleep Apnea (CHARLOTTE).  Patient's risk factors for CHARLOTTE include: snoring, excessive daytime sleepiness/subjective sleepiness (ESS normal at 8), gasping for air in sleep, obesity (BMI 36), and family history. We discussed pathophysiology of CHARLOTTE and consequences of untreated moderate to severe sleep apnea such as a higher risk of hypertension, cardiovascular disease, cardiac arrhythmias, and stroke. Patient is interested in treatment and willing to undergo overnight sleep testing. Discussed testing with overnight attended polysomnography versus home sleep apnea testing. Per patient and insurance preference a home sleep apnea test was ordered today.   - HST-Home Sleep Apnea Test; Future    Information given in patient instructions on potential treatment modalities in the event sleep apnea is identified on testing, will plan to discuss in detail at next visit pending study results.     Follow up 1-2 weeks following her study for review of results and to expedite care. Educational materials provided in instructions.       Chief  "Complaint / Reason for Sleep Consult:     Chief Complaint   Patient presents with     Consult     Gasping for air            History of Present Illness:     Micheline Camargo is a 40 year old female with asthma and obesity who presents to the clinic for evaluation of snoring and gasping in sleep. Reports she is unable to sleep supine due to gasp/choking arousals. Sleeps primarily on stomach or sides now due to gasp arousals on back.  reports snoring but not on a nightly basis. He has never reported witnessed apnea event. Reports nocturia 3 days per week.  Rare nocturnal GERD. Denies morning headaches.  Denies morning dry mouth.  Denies morning nasal/sinus congestion.     Sleep schedule: On weekdays, goes to bed at 9:00PM with 30-60 minute sleep latency and awakens between 6:30-7:00AM. On weekends, schedule is shifted with typical bedtime 10-11:00PM and wake time 7:30-8:00AM. Wakes on average 3-4 times per night for various reasons, could be to use restroom or from new puppy or due to a noise. Feels she is a light sleeper so small things can wake her. Unknown how long it takes her to return to sleep after each awakening but feels it takes \"1-3 hours to fall back fully asleep in a deep sleep\".     She had a total [place] Sleepiness Scale of  8, with scores of 10 or higher indicating abnormal levels of sleepiness. Denies planned daytime napping. Rare inadvertent dozing on Friday nights if laying down and watching a movie but will not doze if sitting up watching TV. Denies any history of falling asleep or dozing while driving. No accidents or near accidents. Patient was counseled on the importance of driving while alert and to pull over if drowsy.     Patient denies typical restless legs syndrome symptoms. She does admittedly rub her feet/legs together at night but \"not from a feeling or anything\". Does this during the day as well, feels it is a habit, has been present since childhood. No kicking in sleep or leg " movements.     No other sleep concerns today.No dream enactment behavior. No somniloquy.  No somnambulism. No sleep related eating. No nightmares or night terrors. Suspects bruxism but unsure.     SCALES       SLEEP APNEA: Stopbang score  2-3/8       INSOMNIA:  Insomnia severity score: 10/28   absence of insomnia (0-7); sub-threshold insomnia (8-14); moderate insomnia (15-21); and severe insomnia (22-28)       SLEEPINESS: [place] sleepiness scale: 8/24 [normal < 11]          Medications:     Current Outpatient Medications   Medication Sig     albuterol (PROAIR HFA/PROVENTIL HFA/VENTOLIN HFA) 108 (90 Base) MCG/ACT inhaler Inhale 2 puffs into the lungs every 6 hours as needed for shortness of breath / dyspnea or wheezing     loratadine (CLARITIN) 10 MG tablet Take 1 tablet (10 mg) by mouth daily     metFORMIN (GLUCOPHAGE-XR) 500 MG 24 hr tablet 2 tablets with a meal daily     No current facility-administered medications for this visit.            Allergies:     No Known Allergies         Past Medical History:     Past Medical History:   Diagnosis Date     Asthma            Past Surgical History:    Previous upper airway surgery: tonsillectomy in childhood   Past Surgical History:   Procedure Laterality Date     C VEE,MIRENA  6/2010, 6/17/15     HC TOOTH EXTRACTION W/FORCEP      wisdom teeth extraction     TONSILLECTOMY  age 8          Social History:     Social History     Tobacco Use     Smoking status: Never Smoker     Smokeless tobacco: Never Used   Substance Use Topics     Alcohol use: Yes     Alcohol/week: 1.7 standard drinks     Types: 2 Glasses of wine per week     Chemical History:  Alcohol use: Once a week or less     Tobacco use: Denies   Illicit substances: Denies   Caffeine intake: Drinks 1 cup of coffee in AM.          Family History:     Family History   Problem Relation Age of Onset     Sleep Apnea Mother      Diabetes Father      C.A.D. Father      Hypertension Father      Lipids Father      Heart  "Disease Father         anglioplasty at late 40s     Sleep Apnea Father      Neurologic Disorder Maternal Grandmother         dementia     Cancer Maternal Grandfather         leukemia     C.A.D. Paternal Grandfather         MI      Sleep Family Hx: CHARLOTTE in both parents. Patient denies any known family history of restless legs syndrome, narcolepsy or other sleep disorders.          Review of Systems:   A complete 10 point review of systems was negative other than HPI or as commented below:   Difficulty breathing or shortness of breath, wheezing, difficulty breathing when lying flat, frequent nighttime urination         Physical Examination:   Ht 1.575 m (5' 2\")   Wt 90.7 kg (200 lb)   BMI 36.58 kg/m    General appearance: Awake, alert, cooperative. Well groomed. Sitting comfortably in chair. In no apparent distress.  HEENT: Head: Normocephalic, atraumatic. Eyes:Conjunctiva clear. Sclera normal. Nose: External appearance without deformity.   Neck: No visible thyroid enlargement.   Cardiovascular: No JVD  Pulmonary:  Able to speak easily in full sentences. No cough or wheeze.   Skin:  No rashes or significant lesions on visible skin.   Neurologic: Alert, oriented x3.   Psychiatric: Mood euthymic. Affect congruent with full range and intensity.         Data: All pertinent previous laboratory data reviewed     No results found for: PH, PHARTERIAL, PO2, [id number], SAT, PCO2, HCO3, BASEEXCESS, ANNEMARIE, BEB  Lab Results   Component Value Date    TSH 4.88 (H) 12/29/2020    TSH 1.88 08/10/2011     Lab Results   Component Value Date    GLC 87 12/09/2020     (H) 11/20/2018     Lab Results   Component Value Date    HGB 14.4 11/20/2018    HGB 11.5 (L) 11/11/2009     Lab Results   Component Value Date    BUN 16 11/20/2018    BUN 16 06/25/2014    CR 0.84 11/20/2018    CR 0.85 06/25/2014     Lab Results   Component Value Date    AST 31 06/25/2014    AST 20 08/10/2011    ALT 27 06/25/2014    ALT <6 08/10/2011    ALKPHOS 82 " 06/25/2014    ALKPHOS 86 08/10/2011    BILITOTAL 0.6 06/25/2014    BILITOTAL 0.6 08/10/2011     No results found for: UAMP, UBARB, BENZODIAZEUR, UCANN, UCOC, OPIT, UPCP    Chest x-ray:   CHEST TWO VIEW 11/20/2018 7:16 PM      COMPARISON: Two-view chest x-ray 1/6/2017.     HISTORY: Chest pain.     FINDINGS: The cardiac silhouette, pulmonary vasculature, lungs and  pleural spaces are within normal limits.                                                          IMPRESSION: Clear lungs.     PFT 11/6/2014: Normal spirometry       Copy to: Alex Mantilla PA-C  Apr 30, 2021     [facility]  [id number] [place] [address]  [address] [address]    Chart documentation was completed, in part, with [employer] voice-recognition software. Even though reviewed, some grammatical, spelling, and word errors may remain.      Video-Visit Details    Type of service:  Video Visit    Video End Time:11:39AM    Originating Location (pt. Location): Home    Distant Location (provider location):  [facility]     Platform used for Video Visit: [employer]  51 minutes spent on day of encounter doing chart review, history and exam, documentation, and further activities as noted above

## 2021-04-30 NOTE — PATIENT INSTRUCTIONS
"MY TREATMENT INFORMATION FOR SLEEP APNEA-  Micheline Camargo    DOCTOR : Diana Gamboa PA-C    Am I having a home sleep study?  --->Watch the video for the device you are using:    -/drop off device-   https://www.[employer].com/watch?v=yGGFBdELGhk    Frequently asked questions:  1. What is Obstructive Sleep Apnea (CHARLOTTE)? CHARLOTTE is the most common type of sleep apnea. Apnea means, \"without breath.\"  Apnea is most often caused by narrowing or collapse of the upper airway as muscles relax during sleep.   Almost everyone has occasional apneas. Most people with sleep apnea have had brief interruptions at night frequently for many years.  The severity of sleep apnea is related to how frequent and severe the events are.   2. What are the consequences of CHARLOTTE? Symptoms include: feeling sleepy during the day, snoring loudly, gasping or stopping of breathing, trouble sleeping, and occasionally morning headaches or heartburn at night.  Sleepiness can be serious and even increase the risk of falling asleep while driving. Other health consequences may include development of high blood pressure and other cardiovascular disease in persons who are susceptible. Untreated CHARLOTTE  can contribute to heart disease, stroke and diabetes.   3. What are the treatment options? In most situations, sleep apnea is a lifelong disease that must be managed with daily therapy. Medications are not effective for sleep apnea and surgery is generally not considered until other therapies have been tried. Your treatment is your choice . Continuous Positive Airway (CPAP) works right away and is the therapy that is effective in nearly everyone. An oral device to hold your jaw forward is usually the next most reliable option. Other options include postioning devices (to keep you off your back), weight loss, and surgery including a tongue pacing device. There is more detail about some of these options below.  4. Are my sleep studies covered by insurance? " Although we will request verification of coverage, we advise you also check in advance of the study to ensure there is coverage.    Important tips for those choosing CPAP and similar devices   Know your equipment:  CPAP is continuous positive airway pressure that prevents obstructive sleep apnea by keeping the throat from collapsing while you are sleeping. In most cases, the device is  smart  and can slowly self-adjusts if your throat collapses and keeps a record every day of how well you are treated-this information is available to you and your care team.  BPAP is bilevel positive airway pressure that keeps your throat open and also assists each breath with a pressure boost to maintain adequate breathing.  Special kinds of BPAP are used in patients who have inadequate breathing from lung or heart disease. In most cases, the device is  smart  and can slowly self-adjusts to assist breathing. Like CPAP, the device keeps a record of how well you are treated.  Your mask is your connection to the device. You get to choose what feels most comfortable and the staff will help to make sure if fits. Here: are some examples of the different masks that are available:       Key points to remember on your journey with sleep apnea:  1. Sleep study.  PAP devices often need to be adjusted during a sleep study to show that they are effective and adjusted right.  2. Good tips to remember: Try wearing just the mask during a quiet time during the day so your body adapts to wearing it. A humidifier is recommended for comfort in most cases to prevent drying of your nose and throat. Allergy medication from your provider may help you if you are having nasal congestion.  3. Getting settled-in. It takes more than one night for most of us to get used to wearing a mask. Try wearing just the mask during a quiet time during the day so your body adapts to wearing it. A humidifier is recommended for comfort in most cases. Our team will work with  you carefully on the first day and will be in contact within 4 days and again at 2 and 4 weeks for advice and remote device adjustments. Your therapy is evaluated by the device each day.   4. Use it every night. The more you are able to sleep naturally for 7-8 hours, the more likely you will have good sleep and to prevent health risks or symptoms from sleep apnea. Even if you use it 4 hours it helps. Occasionally all of us are unable to use a medical therapy, in sleep apnea, it is not dangerous to miss one night.   5. Communicate. Call our skilled team on the number provided on the first day if your visit for problems that make it difficult to wear the device. Over 2 out of 3 patients can learn to wear the device long-term with help from our team. Remember to call our team or your sleep providers if you are unable to wear the device as we may have other solutions for those who cannot adapt to mask CPAP therapy. It is recommended that you sleep your sleep provider within the first 3 months and yearly after that if you are not having problems.   6. Use it for your health. We encourage use of CPAP masks during daytime quiet periods to allow your face and brain to adapt to the sensation of CPAP so that it will be a more natural sensation to awaken to at night or during naps. This can be very useful during the first few weeks or months of adapting to CPAP though it does not help medically to wear CPAP during wakefulness and  should not be used as a strategy just to meet guidelines.  7. Take care of your equipment. Make sure you clean your mask and tubing using directions every day and that your filter and mask are replaced as recommended or if they are not working.     BESIDES CPAP, WHAT OTHER THERAPIES ARE THERE?    Positioning Device  Positioning devices are generally used when sleep apnea is mild and only occurs on your back.This example shows a pillow that straps around the waist. It may be appropriate for those  whose sleep study shows milder sleep apnea that occurs primarily when lying flat on one's back. Preliminary studies have shown benefit but effectiveness at home may need to be verified by a home sleep test. These devices are generally not covered by medical insurance.  Examples of devices that maintain sleeping on the back to prevent snoring and mild sleep apnea.    Belt type body positioner  http://Mob[employer]/    Electronic reminder  http://nightshifttherapy.com/  http://www.Between.Mobile Complete.au/      Oral Appliance  What is oral appliance therapy?  An oral appliance device fits on your teeth at night like a retainer used after having braces. The device is made by a specialized dentist and requires several visits over 1-2 months before a manufactured device is made to fit your teeth and is adjusted to prevent your sleep apnea. Once an oral device is working properly, snoring should be improved. A home sleep test may be recommended at that time if to determine whether the sleep apnea is adequately treated.       Some things to remember:  -Oral devices are often, but not always, covered by your medical insurance. Be sure to check with your insurance provider.   -If you are referred for oral therapy, you will be given a list of specialized dentists to consider or you may choose to visit the Web site of the American Academy of Dental Sleep Medicine  -Oral devices are less likely to work if you have severe sleep apnea or are extremely overweight.     More detailed information  An oral appliance is a small acrylic device that fits over the upper and lower teeth  (similar to a retainer or a mouth guard). This device slightly moves jaw forward, which moves the base of the tongue forward, opens the airway, improves breathing for effective treat snoring and obstructive sleep apnea in perhaps 7 out of 10 people .  The best working devices are custom-made by a dental device  after a mold is made of the teeth 1, 2,  3.  When is an oral appliance indicated?  Oral appliance therapy is recommended as a first-line treatment for patients with primary snoring, mild sleep apnea, and for patients with moderate sleep apnea who prefer appliance therapy to use of CPAP4, 5. Severity of sleep apnea is determined by sleep testing and is based on the number of respiratory events per hour of sleep.   How successful is oral appliance therapy?  The success rate of oral appliance therapy in patients with mild sleep apnea is 75-80% while in patients with moderate sleep apnea it is 50-70%. The chance of success in patients with severe sleep apnea is 40-50%. The research also shows that oral appliances have a beneficial effect on the cardiovascular health of CHARLOTTE patients at the same magnitude as CPAP therapy7.  Oral appliances should be a second-line treatment in cases of severe sleep apnea, but if not completely successful then a combination therapy utilizing CPAP plus oral appliance therapy may be effective. Oral appliances tend to be effective in a broad range of patients although studies show that the patients who have the highest success are females, younger patients, those with milder disease, and less severe obesity. 3, 6.   Finding a dentist that practices dental sleep medicine  Specific training is available through the American Academy of Dental Sleep Medicine for dentists interested in working in the field of sleep. To find a dentist who is educated in the field of sleep and the use of oral appliances, near you, visit the Web site of the American Academy of Dental Sleep Medicine.    References  1. Destini et al. Objectively measured vs self-reported compliance during oral appliance therapy for sleep-disordered breathing. Chest 2013; 144(5): 8754-1323.  2. Mignon et al. Objective measurement of compliance during oral appliance therapy for sleep-disordered breathing. Thorax 2013; 68(1): 91-96.  3. Micaela et al. Mandibular  advancement devices in 620 men and women with CHARLOTTE and snoring: tolerability and predictors of treatment success. Chest 2004; 125: 3198-5586.  4. Danette et al. Oral appliances for snoring and CHARLOTTE: a review. Sleep 2006; 29: 244-262.  5. Katherine et al. Oral appliance treatment for CHARLOTTE: an update. J Clin Sleep Med 2014; 10(2): 215-227.  6. Wyatt et al. Predictors of OSAH treatment outcome. J Dent Res 2007; 86: 5394-1605.      Weight Loss:    Weight loss is a long-term strategy that may improve sleep apnea in some patients.    Weight management is a personal decision and the decision should be based on your interest and the potential benefits.  If you are interested in exploring weight loss strategies, the following discussion covers the impact on weight loss on sleep apnea and the approaches that may be successful.    Being overweight does not necessarily mean you will have health consequences.  Those who have BMI over 35 or over 27 with existing medical conditions carries greater risk.   Weight loss decreases severity of sleep apnea in most people with obesity. For those with mild obesity who have developed snoring with weight gain, even 15-30 pound weight loss can improve and occasionally eliminate sleep apnea.  Structured and life-long dietary and health habits are necessary to lose weight and keep healthier weight levels.     Though there may be significant health benefits from weight loss, long-term weight loss is very difficult to achieve- studies show success with dietary management in less than 10% of people. In addition, substantial weight loss may require years of dietary control and may be difficult if patients have severe obesity. In these cases, surgical management may be considered.  Finally, older individuals who have tolerated obesity without health complications may be less likely to benefit from weight loss strategies.        Your BMI is Body mass index is 36.58 kg/m .  Weight management is a  personal decision.  If you are interested in exploring weight loss strategies, the following discussion covers the approaches that may be successful. Body mass index (BMI) is one way to tell whether you are at a healthy weight, overweight, or obese. It measures your weight in relation to your height.  A BMI of 18.5 to 24.9 is in the healthy range. A person with a BMI of 25 to 29.9 is considered overweight, and someone with a BMI of 30 or greater is considered obese. More than two-thirds of American adults are considered overweight or obese.  Being overweight or obese increases the risk for further weight gain. Excess weight may lead to heart disease and diabetes.  Creating and following plans for healthy eating and physical activity may help you improve your health.  Weight control is part of healthy lifestyle and includes exercise, emotional health, and healthy eating habits. Careful eating habits lifelong are the mainstay of weight control. Though there are significant health benefits from weight loss, long-term weight loss with diet alone may be very difficult to achieve- studies show long-term success with dietary management in less than 10% of people. Attaining a healthy weight may be especially difficult to achieve in those with severe obesity. In some cases, medications, devices and surgical management might be considered.  What can you do?  If you are overweight or obese and are interested in methods for weight loss, you should discuss this with your provider.     Consider reducing daily calorie intake by 500 calories.     Keep a food journal.     Avoiding skipping meals, consider cutting portions instead.    Diet combined with exercise helps maintain muscle while optimizing fat loss. Strength training is particularly important for building and maintaining muscle mass. Exercise helps reduce stress, increase energy, and improves fitness. Increasing exercise without diet control, however, may not burn enough  calories to loose weight.       Start walking three days a week 10-20 minutes at a time    Work towards walking thirty minutes five days a week     Eventually, increase the speed of your walking for 1-2 minutes at time    In addition, we recommend that you review healthy lifestyles and methods for weight loss available through the National Institutes of Health patient information sites:  http://win.niddk.nih.gov/publications/index.htm    And look into health and wellness programs that may be available through your health insurance provider, employer, local community center, or [employer].    Weight management plan: Patient was referred to their PCP to discuss a diet and exercise plan.      Surgery:    Surgery for obstructive sleep apnea is considered generally only when other therapies fail to work. Surgery may be discussed with you if you are having a difficult time tolerating CPAP and or when there is an abnormal structure that requires surgical correction.  Nose and throat surgeries often enlarge the airway to prevent collapse.  Most of these surgeries create pain for 1-2 weeks and up to half of the most common surgeries are not effective throughout life.  You should carefully discuss the benefits and drawbacks to surgery with your sleep provider and surgeon to determine if it is the best solution for you.   More information  Surgery for CHARLOTTE is directed at areas that are responsible for narrowing or complete obstruction of the airway during sleep.  There are a wide range of procedures available to enlarge and/or stabilize the airway to prevent blockage of breathing in the three major areas where it can occur: the palate, tongue, and nasal regions.  Successful surgical treatment depends on the accurate identification of the factors responsible for obstructive sleep apnea in each person.  A personalized approach is required because there is no single treatment that works well for everyone.  Because of anatomic  variation, consultation with an examination by a sleep surgeon is a critical first step in determining what surgical options are best for each patient.  In some cases, examination during sedation may be recommended in order to guide the selection of procedures.  Patients will be counseled about risks and benefits as well as the typical recovery course after surgery. Surgery is typically not a cure for a person s CHARLOTTE.  However, surgery will often significantly improve one s CHARLOTTE severity (termed  success rate ).  Even in the absence of a cure, surgery will decrease the cardiovascular risk associated with OSA7; improve overall quality of life8 (sleepiness, functionality, sleep quality, etc).      Palate Procedures:  Patients with CHARLOTTE often have narrowing of their airway in the region of their tonsils and uvula.  The goals of palate procedures are to widen the airway in this region as well as to help the tissues resist collapse.  Modern palate procedure techniques focus on tissue conservation and soft tissue rearrangement, rather than tissue removal.  Often the uvula is preserved in this procedure. Residual sleep apnea is common in patient after pharyngoplasty with an average reduction in sleep apnea events of 33%2.      Tongue Procedures:  ExamWhile patients are awake, the muscles that surround the throat are active and keep this region open for breathing. These muscles relax during sleep, allowing the tongue and other structures to collapse and block breathing.  There are several different tongue procedures available.  Selection of a tongue base procedure depends on characteristics seen on physical exam.  Generally, procedures are aimed at removing bulky tissues in this area or preventing the back of the tongue from falling back during sleep.  Success rates for tongue surgery range from 50-62%3.    Hypoglossal Nerve Stimulation:  Hypoglossal nerve stimulation has recently received approval from the [place] Food  and Drug Administration for the treatment of obstructive sleep apnea.  This is based on research showing that the system was safe and effective in treating sleep apnea6.  Results showed that the median AHI score decreased 68%, from 29.3 to 9.0. This therapy uses an implant system that senses breathing patterns and delivers mild stimulation to airway muscles, which keeps the airway open during sleep.  The system consists of three fully implanted components: a small generator (similar in size to a pacemaker), a breathing sensor, and a stimulation lead.  Using a small handheld remote, a patient turns the therapy on before bed and off upon awakening.    Candidates for this device must be greater than 22 years of age, have moderate to severe CHARLOTTE (AHI between 20-65), BMI less than 32, have tried CPAP/oral appliance without success, and have appropriate upper airway anatomy (determined by a sleep endoscopy performed by Dr. Wang).    Hypoglossal Nerve Stimulation Pathway:    The sleep surgeon s office will work with the patient through the insurance prior-authorization process (including communications and appeals).    Nasal Procedures:  Nasal obstruction can interfere with nasal breathing during the day and night.  Studies have shown that relief of nasal obstruction can improve the ability of some patients to tolerate positive airway pressure therapy for obstructive sleep apnea1.  Treatment options include medications such as nasal saline, topical corticosteroid and antihistamine sprays, and oral medications such as antihistamines or decongestants. Non-surgical treatments can include external nasal dilators for selected patients. If these are not successful by themselves, surgery can improve the nasal airway either alone or in combination with these other options.      Combination Procedures:  Combination of surgical procedures and other treatments may be recommended, particularly if patients have more than one area of  narrowing or persistent positional disease.  The success rate of combination surgery ranges from 66-80%2,3.    References  1. Willard WATERMAN. The Role of the Nose in Snoring and Obstructive Sleep Apnoea: An Update.  Eur Arch Otorhinolaryngol. 2011; 268: 1365-73.  2.  Corinna SM; Georgette JA; Izzy JR; Pallanch JF; Kathryn MB; Oswaldo SG; Greg ALLEN. Surgical modifications of the upper airway for obstructive sleep apnea in adults: a systematic review and meta-analysis. SLEEP 2010;33(10):6416-3745. Deep BOLANOS. Hypopharyngeal surgery in obstructive sleep apnea: an evidence-based medicine review.  Arch Otolaryngol Head Neck Surg. 2006 Feb;132(2):206-13.  3. Franck YH1, Marlen Y, Colby HEAVEN. The efficacy of anatomically based multilevel surgery for obstructive sleep apnea. Otolaryngol Head Neck Surg. 2003 Oct;129(4):327-35.  4. Deep BOLANOS, Goldberg A. Hypopharyngeal Surgery in Obstructive Sleep Apnea: An Evidence-Based Medicine Review. Arch Otolaryngol Head Neck Surg. 2006 Feb;132(2):206-13.  5. Danielle PJ et al. Upper-Airway Stimulation for Obstructive Sleep Apnea.  N Engl J Med. 2014 Jan 9;370(2):139-49.  6. Alessio Y et al. Increased Incidence of Cardiovascular Disease in Middle-aged Men with Obstructive Sleep Apnea. Am J Respir Crit Care Med; 2002 166: 159-165  7. Sims EM et al. Studying Life Effects and Effectiveness of Palatopharyngoplasty (SLEEP) study: Subjective Outcomes of Isolated Uvulopalatopharyngoplasty. Otolaryngol Head Neck Surg. 2011; 144: 623-631.    Drive Safe... Drive Alive     Sleep health profoundly affects your health, mood, and your safety.  Thirty three percent of the population (one in three of us) is not getting enough sleep and many have a sleep disorder. Not getting enough sleep or having an untreated / undertreated sleep condition may make us sleepy without even knowing it. In fact, our driving could be dramatically impaired due to our sleep health. As your provider, here are some things I would like  you to know about driving:     Here are some warning signs for impairment and dangerous drowsy driving:              -Having been awake more than 16 hours               -Looking tired               -Eyelid drooping              -Head nodding (it could be too late at this point)              -Driving for more than 30 minutes     Some things you could do to make the driving safer if you are experiencing some drowsiness:              -Stop driving and rest              -Call for transportation              -Make sure your sleep disorder is adequately treated     Some things that have been shown NOT to work when experiencing drowsiness while driving:              -Turning on the radio              -Opening windows              -Eating any  distracting  /  entertaining  foods (e.g., sunflower seeds, candy, or any other)              -Talking on the phone      Your decision may not only impact your life, but also the life of others. Please, remember to drive safe for yourself and all of us.

## 2021-06-29 ENCOUNTER — TELEPHONE (OUTPATIENT)
Dept: SLEEP MEDICINE | Facility: CLINIC | Age: 41
End: 2021-06-29

## 2021-06-29 NOTE — TELEPHONE ENCOUNTER
Reason for call:  Other   Patient called regarding (reason for call): appointment  Additional comments: Per patient: needs to reschedule at home sleep study    Phone number to reach patient:  Cell number on file:    Telephone Information:   Mobile [phone]       Best Time:  Anytime    Can we leave a detailed message on this number?  YES    Travel screening: Not Applicable

## 2021-09-08 ENCOUNTER — OFFICE VISIT (OUTPATIENT)
Dept: SLEEP MEDICINE | Facility: CLINIC | Age: 41
End: 2021-09-08
Payer: COMMERCIAL

## 2021-09-08 ENCOUNTER — MYC MEDICAL ADVICE (OUTPATIENT)
Dept: SURGERY | Facility: CLINIC | Age: 41
End: 2021-09-08

## 2021-09-08 DIAGNOSIS — R06.83 SNORING: ICD-10-CM

## 2021-09-08 DIAGNOSIS — E66.01 MORBID OBESITY (H): ICD-10-CM

## 2021-09-08 DIAGNOSIS — R06.89 GASPING FOR BREATH: Primary | ICD-10-CM

## 2021-09-08 RX ORDER — METFORMIN HCL 500 MG
TABLET, EXTENDED RELEASE 24 HR ORAL
Qty: 180 TABLET | Refills: 0 | Status: SHIPPED | OUTPATIENT
Start: 2021-09-08 | End: 2021-12-13

## 2021-09-09 ENCOUNTER — DOCUMENTATION ONLY (OUTPATIENT)
Dept: SLEEP MEDICINE | Facility: CLINIC | Age: 41
End: 2021-09-09
Payer: COMMERCIAL

## 2021-09-14 ENCOUNTER — OFFICE VISIT (OUTPATIENT)
Dept: SLEEP MEDICINE | Facility: CLINIC | Age: 41
End: 2021-09-14
Payer: COMMERCIAL

## 2021-09-14 DIAGNOSIS — R06.89 GASPING FOR BREATH: Primary | ICD-10-CM

## 2021-09-14 DIAGNOSIS — R06.83 SNORING: ICD-10-CM

## 2021-09-15 ENCOUNTER — DOCUMENTATION ONLY (OUTPATIENT)
Dept: SLEEP MEDICINE | Facility: CLINIC | Age: 41
End: 2021-09-15
Payer: COMMERCIAL

## 2021-09-15 NOTE — PROGRESS NOTES
This HSAT was performed using a Noxturnal T3 device which recorded snore, sound, movement activity, body position, nasal pressure, oronasal thermal airflow, pulse, oximetry and both chest and abdominal respiratory effort. HSAT data was restricted to the time patient states they were in bed.     HSAT was scored using 1B 4% hypopnea rule.     HST AHI (Non-PAT): 3.9  Snoring was reported as mild, moderate and intermittent.  Time with SpO2 below 89% was 0 minutes.   Overall signal quality was good.     Pt will follow up with sleep provider to determine appropriate therapy.       HST POST-STUDY QUESTIONNAIRE    1. What time did you go to bed?  9:30  2. How long do you think it took to fall asleep?  10:00  3. What time did you wake up to start the day?  6:30  4. Did you get up during the night at all?  yes  5. If you woke up, do you remember approximately what time(s)? 1:30 or 2:30  6. Did you have any difficulty with the equipment?  No  7. Did you us any type of treatment with this study?  None  8. Was the head of the bed elevated? No  9. Did you sleep in a recliner?  No  10. Did you stop using CPAP at least 3 days before this test?  NA  11. Any other information you'd like us to know? no

## 2021-09-16 DIAGNOSIS — R06.83 SNORING: ICD-10-CM

## 2021-09-16 DIAGNOSIS — E66.01 MORBID OBESITY (H): ICD-10-CM

## 2021-09-16 DIAGNOSIS — R06.89 GASPING FOR BREATH: ICD-10-CM

## 2021-09-16 PROCEDURE — G0399 HOME SLEEP TEST/TYPE 3 PORTA: HCPCS | Performed by: INTERNAL MEDICINE

## 2021-09-16 ASSESSMENT — SLEEP AND FATIGUE QUESTIONNAIRES
HOW LIKELY ARE YOU TO NOD OFF OR FALL ASLEEP WHILE WATCHING TV: SLIGHT CHANCE OF DOZING
HOW LIKELY ARE YOU TO NOD OFF OR FALL ASLEEP WHILE LYING DOWN TO REST IN THE AFTERNOON WHEN CIRCUMSTANCES PERMIT: MODERATE CHANCE OF DOZING
HOW LIKELY ARE YOU TO NOD OFF OR FALL ASLEEP WHILE SITTING QUIETLY AFTER LUNCH WITHOUT ALCOHOL: WOULD NEVER DOZE
HOW LIKELY ARE YOU TO NOD OFF OR FALL ASLEEP IN A CAR, WHILE STOPPED FOR A FEW MINUTES IN TRAFFIC: WOULD NEVER DOZE
HOW LIKELY ARE YOU TO NOD OFF OR FALL ASLEEP WHILE SITTING AND TALKING TO SOMEONE: WOULD NEVER DOZE
HOW LIKELY ARE YOU TO NOD OFF OR FALL ASLEEP WHILE SITTING AND READING: MODERATE CHANCE OF DOZING
HOW LIKELY ARE YOU TO NOD OFF OR FALL ASLEEP WHEN YOU ARE A PASSENGER IN A CAR FOR AN HOUR WITHOUT A BREAK: MODERATE CHANCE OF DOZING
HOW LIKELY ARE YOU TO NOD OFF OR FALL ASLEEP WHILE SITTING INACTIVE IN A PUBLIC PLACE: WOULD NEVER DOZE

## 2021-09-16 NOTE — PROCEDURES
"HOME SLEEP STUDY INTERPRETATION    Patient: Micheline Camargo  MRN: [id number]  YOB: 1980  Study Date: 9/14/2021  Referring Provider: Alex Mantilla;   Ordering Provider: [place]OR[place] Arceo     Indications for Home Study: Micheline aCmargo is a 41 year old female with a history of asthma who presents with symptoms suggestive of obstructive sleep apnea.    Estimated body mass index is 36.58 kg/m  as calculated from the following:    Height as of 4/30/21: 1.575 m (5' 2\").    Weight as of 4/30/21: 90.7 kg (200 lb).  Total score - [place]: 7 (9/16/2021  1:20 PM)  STOP-BANG: 3/8    Data: A full night home sleep study was performed recording the standard physiologic parameters including body position, movement, sound, nasal pressure, thermal oral airflow, chest and abdominal movements with respiratory inductance plethysmography, and oxygen saturation by pulse oximetry. Pulse rate was estimated by oximetry recording. This study was considered adequate based on > 4 hours of quality oximetry and respiratory recording. As specified by the AASM Manual for the Scoring of Sleep and Associated events, version 2.3, Rule VIII.D 1B, 4% oxygen desaturation scoring for hypopneas is used as a standard of care on all home sleep apnea testing.    Analysis Time:  531 minutes    Respiration:   Sleep Associated Hypoxemia: sustained hypoxemia was not present. Baseline oxygen saturation was 94%.  Time with saturation less than or equal to 88% was 0 minutes. The lowest oxygen saturation was 89%.   Snoring: Snoring was present.  Respiratory events: The home study revealed a presence of 0 obstructive apneas and 0 mixed and central apneas. There were 31 hypopneas resulting in a combined apnea/hypopnea index [AHI] of 3.9 events per hour.  AHI was 12 per hour supine, 0 per hour prone, 1.1 per hour on left side, and 0 per hour on right side.   Pattern: Excluding events noted above, respiratory rate and pattern was Normal.    Position: " Percent of time spent: supine - 26%, prone - 20.8%, on left - 30.8%, on right - 12.8%.    Heart Rate: By pulse oximetry normal rate was noted.     Assessment:   Negative for clinically significant sleep apnea.  A mild degree of sleep apnea was seen in supine position.  Overall apnea-hypopnea index is in the normal range  Sleep associated hypoxemia was not present.    Recommendations:  Consider positional therapy to avoid supine sleep.   Suggest optimizing sleep hygiene and avoiding sleep deprivation.  Weight management.    Diagnosis Code(s): Snoring R06.83    Garrick Felix MD, September 16, 2021   Diplomate, American Board of Psychiatry and Neurology, Sleep Medicine

## 2021-09-22 NOTE — PROGRESS NOTES
"[facility]   Outpatient Sleep Medicine  Sep 23, 2021       Name: Micheline Camargo MRN# [id number]   Age: 41 year old YOB: 1980            Assessment and Plan:   1. Snoring  During this visit, we reviewed the summary of the study including position, event distribution, oximetry and heart rate. Negative for clinically significant sleep apnea. Overall apnea-hypopnea index is in the normal range but there was a mild degree of sleep apnea seen in supine position (overall AHI 3.9, supine AHI 12, non-supine AHI <1). Sleep associated hypoxemia was not present.     Patient reassures no significant sleep disordered breathing identified. Recommended she avoid sleeping supine, prescription for sleep positioning device provided today. Also encouraged weight loss as this should help improve snoring.   - Sleep Positioning Device  ()    For now will follow-up as needed.        Chief Complaint      Chief Complaint   Patient presents with     Study Results     HST f/u          History of Present Illness:   Micheline Camargo is a 41 year old female who presents to the clinic for results of recent sleep study completed on 9/14/2021. Study was completed to evaluate for obstructive sleep apnea.     Patient reports she slept ok the night of her study, \"or as normal as it can be with the monitors\".      Reviewed results of sleep study with patient as follows:   Analysis Time:  531 minutes     Respiration:   Sleep Associated Hypoxemia: sustained hypoxemia was not present. Baseline oxygen saturation was 94%.  Time with saturation less than or equal to 88% was 0 minutes. The lowest oxygen saturation was 89%.   Snoring: Snoring was present.  Respiratory events: The home study revealed a presence of 0 obstructive apneas and 0 mixed and central apneas. There were 31 hypopneas resulting in a combined apnea/hypopnea index [AHI] of 3.9 events per hour.  AHI was 12 per hour supine, 0 per hour prone, 1.1 per " "hour on left side, and 0 per hour on right side.   Pattern: Excluding events noted above, respiratory rate and pattern was Normal.     Position: Percent of time spent: supine - 26%, prone - 20.8%, on left - 30.8%, on right - 12.8%.     Heart Rate: By pulse oximetry normal rate was noted.      Past medical/surgical history, family history, social history, medications and allergies were reviewed.           Physical Examination:   /75   Pulse 95   Ht 1.575 m (5' 2\")   Wt 98.4 kg (217 lb)   SpO2 96%   BMI 39.69 kg/m    General appearance: Awake, alert, cooperative. Well groomed. Sitting comfortably. In no apparent distress.  HEENT: Head: Normocephalic, atraumatic. Eyes:Conjunctiva clear. Sclera normal. Remainder of face covered by mask.   Pulmonary:  Able to speak easily in full sentences. No cough or wheeze.   Skin:  No rashes or significant lesions on visible skin.   Neurologic: Alert, oriented x3.   Psychiatric: Mood euthymic. Affect congruent with full range and intensity.      CC:  Alex Mantilla PA-C  Sep 23, 2021     [facility]  [address] [place] [address]     [facility]  [address] [address]    Chart documentation was completed, in part, with [employer] Nexus voice-recognition software. Even though reviewed, some grammatical, spelling, and word errors may remain.    22 minutes spent on day of encounter doing chart review, history and exam, documentation, and further activities as noted above      "

## 2021-09-23 ENCOUNTER — OFFICE VISIT (OUTPATIENT)
Dept: SLEEP MEDICINE | Facility: CLINIC | Age: 41
End: 2021-09-23
Payer: COMMERCIAL

## 2021-09-23 VITALS
BODY MASS INDEX: 39.93 KG/M2 | WEIGHT: 217 LBS | DIASTOLIC BLOOD PRESSURE: 75 MMHG | OXYGEN SATURATION: 96 % | HEART RATE: 95 BPM | SYSTOLIC BLOOD PRESSURE: 101 MMHG | HEIGHT: 62 IN

## 2021-09-23 DIAGNOSIS — R06.83 SNORING: Primary | ICD-10-CM

## 2021-09-23 PROCEDURE — 99213 OFFICE O/P EST LOW 20 MIN: CPT | Performed by: PHYSICIAN ASSISTANT

## 2021-09-23 ASSESSMENT — MIFFLIN-ST. JEOR: SCORE: 1597.56

## 2021-09-23 NOTE — PATIENT INSTRUCTIONS
"HOME SLEEP STUDY INTERPRETATION     Patient: Micheline Camargo  MRN: [id number]  YOB: 1980  Study Date: 9/14/2021  Referring Provider: Alex Mantilla;   Ordering Provider: [place] Arceo     Indications for Home Study: Micheline Camargo is a 41 year old female with a history of asthma who presents with symptoms suggestive of obstructive sleep apnea.     Estimated body mass index is 36.58 kg/m  as calculated from the following:    Height as of 4/30/21: 1.575 m (5' 2\").    Weight as of 4/30/21: 90.7 kg (200 lb).  Total score - [place]: 7 (9/16/2021  1:20 PM)  STOP-BANG: 3/8     Data: A full night home sleep study was performed recording the standard physiologic parameters including body position, movement, sound, nasal pressure, thermal oral airflow, chest and abdominal movements with respiratory inductance plethysmography, and oxygen saturation by pulse oximetry. Pulse rate was estimated by oximetry recording. This study was considered adequate based on > 4 hours of quality oximetry and respiratory recording. As specified by the AASM Manual for the Scoring of Sleep and Associated events, version 2.3, Rule VIII.D 1B, 4% oxygen desaturation scoring for hypopneas is used as a standard of care on all home sleep apnea testing.     Analysis Time:  531 minutes     Respiration:   Sleep Associated Hypoxemia: sustained hypoxemia was not present. Baseline oxygen saturation was 94%.  Time with saturation less than or equal to 88% was 0 minutes. The lowest oxygen saturation was 89%.   Snoring: Snoring was present.  Respiratory events: The home study revealed a presence of 0 obstructive apneas and 0 mixed and central apneas. There were 31 hypopneas resulting in a combined apnea/hypopnea index [AHI] of 3.9 events per hour.  AHI was 12 per hour supine, 0 per hour prone, 1.1 per hour on left side, and 0 per hour on right side.   Pattern: Excluding events noted above, respiratory rate and pattern was " "Normal.     Position: Percent of time spent: supine - 26%, prone - 20.8%, on left - 30.8%, on right - 12.8%.     Heart Rate: By pulse oximetry normal rate was noted.      Assessment:   Negative for clinically significant sleep apnea.  A mild degree of sleep apnea was seen in supine position.  Overall apnea-hypopnea index is in the normal range  Sleep associated hypoxemia was not present.     Recommendations:  Consider positional therapy to avoid supine sleep.   Suggest optimizing sleep hygiene and avoiding sleep deprivation.  Weight management.     Diagnosis Code(s): Snoring R06.83     Garrick Felix MD, September 16, 2021   Diplomate, American Board of Psychiatry and Neurology, Sleep Medicine    Positioning Device  Positioning devices are generally used when sleep apnea is mild and only occurs on your back.This example shows a pillow that straps around the waist. It may be appropriate for those whose sleep study shows milder sleep apnea that occurs primarily when lying flat on one's back. Preliminary studies have shown benefit but effectiveness at home may need to be verified by a home sleep test. These devices are generally not covered by medical insurance.  Examples of devices that maintain sleeping on the back to prevent snoring and mild sleep apnea.    Belt type body positioner  Http://zzomaosa.com/  \"Slumber Bump\"  \"Sleep Noodle\"    Electronic reminder  http://nightshifttherapy.com/  http://www.buzzpod.com.au/        Your BMI is Body mass index is 39.69 kg/m .  Weight management is a personal decision.  If you are interested in exploring weight loss strategies, the following discussion covers the approaches that may be successful. Body mass index (BMI) is one way to tell whether you are at a healthy weight, overweight, or obese. It measures your weight in relation to your height.  A BMI of 18.5 to 24.9 is in the healthy range. A person with a BMI of 25 to 29.9 is considered overweight, and someone with a BMI of " 30 or greater is considered obese. More than two-thirds of American adults are considered overweight or obese.  Being overweight or obese increases the risk for further weight gain. Excess weight may lead to heart disease and diabetes.  Creating and following plans for healthy eating and physical activity may help you improve your health.  Weight control is part of healthy lifestyle and includes exercise, emotional health, and healthy eating habits. Careful eating habits lifelong are the mainstay of weight control. Though there are significant health benefits from weight loss, long-term weight loss with diet alone may be very difficult to achieve- studies show long-term success with dietary management in less than 10% of people. Attaining a healthy weight may be especially difficult to achieve in those with severe obesity. In some cases, medications, devices and surgical management might be considered.  What can you do?  If you are overweight or obese and are interested in methods for weight loss, you should discuss this with your provider.     Consider reducing daily calorie intake by 500 calories.     Keep a food journal.     Avoiding skipping meals, consider cutting portions instead.    Diet combined with exercise helps maintain muscle while optimizing fat loss. Strength training is particularly important for building and maintaining muscle mass. Exercise helps reduce stress, increase energy, and improves fitness. Increasing exercise without diet control, however, may not burn enough calories to loose weight.       Start walking three days a week 10-20 minutes at a time    Work towards walking thirty minutes five days a week     Eventually, increase the speed of your walking for 1-2 minutes at time    In addition, we recommend that you review healthy lifestyles and methods for weight loss available through the National Institutes of Health patient information  sites:  http://win.niddk.nih.gov/publications/index.htm    And look into health and wellness programs that may be available through your health insurance provider, employer, local community center, or [employer].    Weight management plan: Patient was referred to their PCP to discuss a diet and exercise plan.

## 2021-09-23 NOTE — NURSING NOTE
"Chief Complaint   Patient presents with     Study Results     HST f/u       Initial /75   Pulse 95   Ht 1.575 m (5' 2\")   Wt 98.4 kg (217 lb)   SpO2 96%   BMI 39.69 kg/m   Estimated body mass index is 39.69 kg/m  as calculated from the following:    Height as of this encounter: 1.575 m (5' 2\").    Weight as of this encounter: 98.4 kg (217 lb).    Medication Reconciliation: complete   ESS 8  Adri Blair CMA  "

## 2021-09-26 ENCOUNTER — HEALTH MAINTENANCE LETTER (OUTPATIENT)
Age: 41
End: 2021-09-26

## 2021-10-26 ENCOUNTER — ANCILLARY PROCEDURE (OUTPATIENT)
Dept: MAMMOGRAPHY | Facility: CLINIC | Age: 41
End: 2021-10-26
Attending: INTERNAL MEDICINE
Payer: COMMERCIAL

## 2021-10-26 DIAGNOSIS — Z12.31 VISIT FOR SCREENING MAMMOGRAM: ICD-10-CM

## 2021-10-26 PROCEDURE — 77063 BREAST TOMOSYNTHESIS BI: CPT | Mod: TC | Performed by: RADIOLOGY

## 2021-10-26 PROCEDURE — 77067 SCR MAMMO BI INCL CAD: CPT | Mod: TC | Performed by: RADIOLOGY

## 2021-11-15 ENCOUNTER — OFFICE VISIT (OUTPATIENT)
Dept: OBGYN | Facility: CLINIC | Age: 41
End: 2021-11-15
Payer: COMMERCIAL

## 2021-11-15 VITALS — DIASTOLIC BLOOD PRESSURE: 76 MMHG | SYSTOLIC BLOOD PRESSURE: 110 MMHG | WEIGHT: 213 LBS | BODY MASS INDEX: 38.96 KG/M2

## 2021-11-15 DIAGNOSIS — Z12.4 SCREENING FOR CERVICAL CANCER: Primary | ICD-10-CM

## 2021-11-15 DIAGNOSIS — Z30.433 ENCOUNTER FOR REMOVAL AND REINSERTION OF INTRAUTERINE CONTRACEPTIVE DEVICE: ICD-10-CM

## 2021-11-15 PROCEDURE — 58301 REMOVE INTRAUTERINE DEVICE: CPT | Performed by: OBSTETRICS & GYNECOLOGY

## 2021-11-15 PROCEDURE — 87624 HPV HI-RISK TYP POOLED RSLT: CPT | Performed by: OBSTETRICS & GYNECOLOGY

## 2021-11-15 PROCEDURE — G0145 SCR C/V CYTO,THINLAYER,RESCR: HCPCS | Performed by: OBSTETRICS & GYNECOLOGY

## 2021-11-15 PROCEDURE — 58300 INSERT INTRAUTERINE DEVICE: CPT | Performed by: OBSTETRICS & GYNECOLOGY

## 2021-11-15 NOTE — PROGRESS NOTES
"  SUBJECTIVE:    Is a pregnancy test required: {Pregnancy test required:564359}  Was a consent obtained?  {Yes/No:401425::\"Yes\"}    Subjective: Micheline Camargo is a 41 year old  presents for IUD and desires *** type IUD.  She requests removal of the IUD because {PLC Removal:968222}    Patient has been given the opportunity to ask questions about all forms of birth control, including all options appropriate for Micheline Camargo. Discussed that no method of birth control, except abstinence is 100% effective against pregnancy or sexually transmitted infection.     Micheline Camargo understands she may have the IUD removed at any time. IUD should be removed by a health care provider and the current IUD will be removed today.    The entire removal and insertion procedure was reviewed with the patient, including care after placement.    Today's PHQ-2 Score:   PHQ-2 (  [employer]) 2020   Q1: Little interest or pleasure in doing things 0   Q2: Feeling down, depressed or hopeless 0   PHQ-2 Score 0   Q1: Little interest or pleasure in doing things Not at all   Q2: Feeling down, depressed or hopeless Not at all   PHQ-2 Score 0       PROCEDURE:    {IUD pain:588749}  A speculum exam was performed and the cervix was visualized. The IUD string {WAS/WAS NOT:9033::\"was\"} visualized. Using ring forceps, the string  was grasped and the IUD removed intact.    Under sterile technique, cervix was visualized with speculum and prepped with {cleansing agents:294185::\"Betadine\"} solution swab x 3. {IUD instruments:600686::\"Tenaculum\"} was placed for stability. The uterus was gently straightened and sounded to {IUD SOUNDING DEPTHS:282084} cm. IUD prepared for placement, and IUD inserted according to 's instructions without difficulty or significant ressitance, and deployed at the fundus. The strings were visualized and trimmed to {IUD SOUNDING DEPTHS:213079} cm from the external os. Tenaculum was removed and " hemostasis noted. Speculum removed.  Patient tolerated procedure well.    Lot # ***  Exp: ***    EBL: minimal    Complications: none      POST PROCEDURE:    Given 's handouts, including when to have IUD removed, list of danger s/sx, side effects and follow up recommended. Encouraged condom use for prevention of STD. Advised to call for any fever, for prolonged or severe pain or bleeding, abnormal vaginal dischage, or unable to palpate strings. She was advised to use pain medications (ibuprofen) as needed for mild to moderate pain. Advised to follow-up in clinic in 4-6 weeks for IUD string check if unable to find strings or as directed by provider.     Emma Flores MD

## 2021-11-15 NOTE — NURSING NOTE
"Chief Complaint   Patient presents with     Contraception     Mirena IUD replacement     Women's Health     Would like pap done today, due soon in April       Initial /76 (BP Location: Right arm, Cuff Size: Adult Regular)   Wt 96.6 kg (213 lb)   LMP  (LMP Unknown)   Breastfeeding No   BMI 38.96 kg/m   Estimated body mass index is 38.96 kg/m  as calculated from the following:    Height as of 21: 1.575 m (5' 2\").    Weight as of this encounter: 96.6 kg (213 lb).  BP completed using cuff size: regular    Questioned patient about current smoking habits.  Pt. has never smoked.          "

## 2021-11-15 NOTE — PROGRESS NOTES
INDICATIONS:                                                      Micheline Camargo is a 41 year old female,, No LMP recorded (lmp unknown). (Menstrual status: IUD). who presents today for mirena  IUD removal and reinsertion. Her current IUD was placed 6 ago. She has not had problems with the IUD. She requests removal of the IUD because the IUD effectiveness has  and would like reinsertion today. The risks, benefits and alternatives of IUD insertion were discussed in detail previously.  She has elected to go ahead with the insertion  today and her questions were answered. Consent was signed. A pregnancy test was not performed today due to continued use of effective contraception. She requests cervical cancer screening while she is here.     Past Medical History:   Diagnosis Date     Asthma       Past Surgical History:   Procedure Laterality Date     C IUD,MIRENA  2010, 6/17/15     HC TOOTH EXTRACTION W/FORCEP      wisdom teeth extraction     TONSILLECTOMY  age 8        ROS:  : no dysmenorrhea, menorrhagia, intermenstrual bleeding    PROCEDURE:                                                    /76 (BP Location: Right arm, Cuff Size: Adult Regular)   Wt 96.6 kg (213 lb)   LMP  (LMP Unknown)   Breastfeeding No   BMI 38.96 kg/m     The pelvic exam revealed normal external genitalia. On bimanual exam the uterus was Anteverted and normal in size with no tenderness present. A speculum was inserted into the vagina and the cervix was visualized. Pap smear was collected. The strings were clearly visible at the external cervical os and they were grasped with a ring forceps and the IUD was removed intact. The cervix was then prepped with Betadine. The anterior lip of the cervix was grasped with a single toothed tenaculum. The uterus sounded to 9 cm. A Mirena IUD was then inserted without difficulty. The string was cut to 2 cm.  The patient experienced a mild amount of cramping.    POST PROCEDURE:                                                     Micheline Camargo is a 41 year old female here for removal and insertion of IUD.  She  tolerated the procedure well. There were no complications. Patient was discharged in stable condition.    1. Screening for cervical cancer    - Pap imaged thin layer screen with HPV - recommended age 30 - 65 years (select HPV order below)    2. Encounter for removal and reinsertion of intrauterine contraceptive device  - levonorgestrel (MIRENA) 20 MCG/24HR IUD 20 mcg  - levonorgestrel (MIRENA) 20 MCG/24HR IUD; 1 each (20 mcg) by Intrauterine route once  - INSERTION INTRAUTERINE DEVICE  - REMOVE INTRAUTERINE DEVICE  - INSERTION INTRAUTERINE DEVICE  - REMOVE INTRAUTERINE DEVICE     Return to clinic in 4-5 weeks for IUD check.  No need for back up contraception given continuous IUD use. Call if severe cramping, fever, abnormal bleeding, abnormal discharge or pelvic pain develop.  Patient was counseled about the chance of irregular bleeding.    Emma Flores MD

## 2021-11-17 LAB
BKR LAB AP GYN ADEQUACY: NORMAL
BKR LAB AP GYN INTERPRETATION: NORMAL
BKR LAB AP HPV REFLEX: NORMAL
BKR LAB AP PREVIOUS ABNORMAL: NORMAL
PATH REPORT.COMMENTS IMP SPEC: NORMAL
PATH REPORT.COMMENTS IMP SPEC: NORMAL
PATH REPORT.RELEVANT HX SPEC: NORMAL

## 2021-11-18 LAB
HUMAN PAPILLOMA VIRUS 16 DNA: NEGATIVE
HUMAN PAPILLOMA VIRUS 18 DNA: NEGATIVE
HUMAN PAPILLOMA VIRUS FINAL DIAGNOSIS: NORMAL
HUMAN PAPILLOMA VIRUS OTHER HR: NEGATIVE

## 2021-12-13 ENCOUNTER — OFFICE VISIT (OUTPATIENT)
Dept: FAMILY MEDICINE | Facility: CLINIC | Age: 41
End: 2021-12-13
Payer: COMMERCIAL

## 2021-12-13 VITALS
SYSTOLIC BLOOD PRESSURE: 92 MMHG | BODY MASS INDEX: 39.56 KG/M2 | WEIGHT: 215 LBS | HEIGHT: 62 IN | TEMPERATURE: 98.4 F | DIASTOLIC BLOOD PRESSURE: 64 MMHG | HEART RATE: 84 BPM

## 2021-12-13 DIAGNOSIS — E66.09 CLASS 2 OBESITY DUE TO EXCESS CALORIES WITHOUT SERIOUS COMORBIDITY WITH BODY MASS INDEX (BMI) OF 39.0 TO 39.9 IN ADULT: Primary | ICD-10-CM

## 2021-12-13 DIAGNOSIS — E03.8 SUBCLINICAL HYPOTHYROIDISM: ICD-10-CM

## 2021-12-13 DIAGNOSIS — E66.812 CLASS 2 OBESITY DUE TO EXCESS CALORIES WITHOUT SERIOUS COMORBIDITY WITH BODY MASS INDEX (BMI) OF 39.0 TO 39.9 IN ADULT: Primary | ICD-10-CM

## 2021-12-13 DIAGNOSIS — Z23 NEED FOR INFLUENZA VACCINATION: ICD-10-CM

## 2021-12-13 DIAGNOSIS — J45.20 MILD INTERMITTENT ASTHMA, UNSPECIFIED WHETHER COMPLICATED: ICD-10-CM

## 2021-12-13 DIAGNOSIS — R63.4 WEIGHT LOSS: ICD-10-CM

## 2021-12-13 LAB — HBA1C MFR BLD: 5.7 % (ref 0–5.6)

## 2021-12-13 PROCEDURE — 90686 IIV4 VACC NO PRSV 0.5 ML IM: CPT | Performed by: NURSE PRACTITIONER

## 2021-12-13 PROCEDURE — 90472 IMMUNIZATION ADMIN EACH ADD: CPT | Performed by: NURSE PRACTITIONER

## 2021-12-13 PROCEDURE — 90715 TDAP VACCINE 7 YRS/> IM: CPT | Performed by: NURSE PRACTITIONER

## 2021-12-13 PROCEDURE — 83036 HEMOGLOBIN GLYCOSYLATED A1C: CPT | Performed by: NURSE PRACTITIONER

## 2021-12-13 PROCEDURE — 36415 COLL VENOUS BLD VENIPUNCTURE: CPT | Performed by: NURSE PRACTITIONER

## 2021-12-13 PROCEDURE — 84443 ASSAY THYROID STIM HORMONE: CPT | Performed by: NURSE PRACTITIONER

## 2021-12-13 PROCEDURE — 99214 OFFICE O/P EST MOD 30 MIN: CPT | Mod: 25 | Performed by: NURSE PRACTITIONER

## 2021-12-13 PROCEDURE — 90471 IMMUNIZATION ADMIN: CPT | Performed by: NURSE PRACTITIONER

## 2021-12-13 PROCEDURE — 80048 BASIC METABOLIC PNL TOTAL CA: CPT | Performed by: NURSE PRACTITIONER

## 2021-12-13 RX ORDER — METFORMIN HCL 500 MG
TABLET, EXTENDED RELEASE 24 HR ORAL
Qty: 180 TABLET | Refills: 0 | Status: SHIPPED | OUTPATIENT
Start: 2021-12-13 | End: 2022-05-12

## 2021-12-13 RX ORDER — ALBUTEROL SULFATE 90 UG/1
2 AEROSOL, METERED RESPIRATORY (INHALATION) EVERY 6 HOURS PRN
Qty: 6.7 G | Refills: 1 | Status: SHIPPED | OUTPATIENT
Start: 2021-12-13 | End: 2022-09-23

## 2021-12-13 ASSESSMENT — MIFFLIN-ST. JEOR: SCORE: 1588.48

## 2021-12-13 NOTE — PROGRESS NOTES
Assessment & Plan     Class 2 obesity due to excess calories without serious comorbidity with body mass index (BMI) of 39.0 to 39.9 in adult  Weight loss  Continue with positive lifestyle changes and metformin. Labs pending.   Discussed Claritin's potential for weight gain and difficulty loss. Will plan for slow taper. She will resume if allergy or asthma symptoms occur.     Subclinical hypothyroidism  Update TSH, no medications at this time.   - TSH with free T4 reflex  - TSH with free T4 reflex    Mild intermittent asthma, unspecified whether complicated  Well  Controlled.   - albuterol (PROAIR HFA/PROVENTIL HFA/VENTOLIN HFA) 108 (90 Base) MCG/ACT inhaler  Dispense: 6.7 g; Refill: 1    Need for influenza vaccination          Return in about 1 year (around 12/13/2022) for Preventive Visit.    LORI Allison [facility]     Micheline Camargo is a 41 year old female who presents to clinic today for the following health issues accompanied by her :    History of Present Illness       She eats 2-3 servings of fruits and vegetables daily.She consumes 0 sweetened beverage(s) daily.She exercises with enough effort to increase her heart rate 30 to 60 minutes per day.  She exercises with enough effort to increase her heart rate 3 or less days per week.   She is taking medications regularly.       Medication Followup of Metformin    Taking Medication as prescribed: yes    Side Effects:  None    Medication Helping Symptoms:  yes     Was working with weight loss clinic; started on metformin and due for refill. Insurance recommended that she see PCP for medication refill/maintenance.   Family history +DMII.   Continues to work with diet and activity. Likes walking for exercise.     Asthma well controlled. Rare use of albuterol.  Using Claritin daily for allergy control.         Review of Systems   Constitutional, HEENT, cardiovascular, pulmonary, gi and gu systems are negative,  "except as otherwise noted.      Objective    BP 92/64 (BP Location: Right arm, Patient Position: Chair, Cuff Size: Adult Large)   Pulse 84   Temp 98.4  F (36.9  C) (Oral)   Ht 1.575 m (5' 2\")   Wt 97.5 kg (215 lb)   LMP  (LMP Unknown)   BMI 39.32 kg/m    Body mass index is 39.32 kg/m .  Physical Exam   GENERAL: healthy, alert and no distress  RESP: regular rate and effort.   PSYCH: mentation appears normal, affect normal/bright          "

## 2021-12-14 LAB
ANION GAP SERPL CALCULATED.3IONS-SCNC: 9 MMOL/L (ref 3–14)
BUN SERPL-MCNC: 11 MG/DL (ref 7–30)
CALCIUM SERPL-MCNC: 8.9 MG/DL (ref 8.5–10.1)
CHLORIDE BLD-SCNC: 107 MMOL/L (ref 94–109)
CO2 SERPL-SCNC: 21 MMOL/L (ref 20–32)
CREAT SERPL-MCNC: 0.91 MG/DL (ref 0.52–1.04)
GFR SERPL CREATININE-BSD FRML MDRD: 78 ML/MIN/1.73M2
GLUCOSE BLD-MCNC: 86 MG/DL (ref 70–99)
POTASSIUM BLD-SCNC: 4.2 MMOL/L (ref 3.4–5.3)
SODIUM SERPL-SCNC: 137 MMOL/L (ref 133–144)
TSH SERPL DL<=0.005 MIU/L-ACNC: 3.95 MU/L (ref 0.4–4)

## 2021-12-14 ASSESSMENT — ASTHMA QUESTIONNAIRES: ACT_TOTALSCORE: 25

## 2022-01-16 ENCOUNTER — HEALTH MAINTENANCE LETTER (OUTPATIENT)
Age: 42
End: 2022-01-16

## 2022-05-12 DIAGNOSIS — E66.812 CLASS 2 OBESITY DUE TO EXCESS CALORIES WITHOUT SERIOUS COMORBIDITY WITH BODY MASS INDEX (BMI) OF 39.0 TO 39.9 IN ADULT: Primary | ICD-10-CM

## 2022-05-12 DIAGNOSIS — E66.09 CLASS 2 OBESITY DUE TO EXCESS CALORIES WITHOUT SERIOUS COMORBIDITY WITH BODY MASS INDEX (BMI) OF 39.0 TO 39.9 IN ADULT: Primary | ICD-10-CM

## 2022-05-16 RX ORDER — METFORMIN HCL 500 MG
1000 TABLET, EXTENDED RELEASE 24 HR ORAL
Qty: 180 TABLET | Refills: 1 | Status: SHIPPED | OUTPATIENT
Start: 2022-05-16 | End: 2022-09-23

## 2022-05-16 NOTE — TELEPHONE ENCOUNTER
See GoAlbert message, pt wondering about metformin refill    Routing refill request to provider for review/approval because:  Patient needs to be seen because:  DM f/u due  Small increase in your A1c. Let's continue current dose of metformin and plan for a recheck in 3 months; sooner as needed.     Routed to , do you want lab only or virtual visit for follow up, see GoAlbert message and inform  Tila Lackey RN, BSN  [facility]

## 2022-08-10 ENCOUNTER — E-VISIT (OUTPATIENT)
Dept: FAMILY MEDICINE | Facility: CLINIC | Age: 42
End: 2022-08-10
Payer: COMMERCIAL

## 2022-08-10 DIAGNOSIS — Z53.9 ERRONEOUS ENCOUNTER--DISREGARD: Primary | ICD-10-CM

## 2022-08-11 NOTE — PATIENT INSTRUCTIONS
Thank you for choosing us for your care. I think an in-clinic visit would be best next steps based on your symptoms. Please schedule a clinic appointment; you won t be charged for this eVisit.      You can schedule an appointment right here in [facility], or call [phone]

## 2022-09-22 ASSESSMENT — ASTHMA QUESTIONNAIRES
QUESTION_3 LAST FOUR WEEKS HOW OFTEN DID YOUR ASTHMA SYMPTOMS (WHEEZING, COUGHING, SHORTNESS OF BREATH, CHEST TIGHTNESS OR PAIN) WAKE YOU UP AT NIGHT OR EARLIER THAN USUAL IN THE MORNING: NOT AT ALL
QUESTION_4 LAST FOUR WEEKS HOW OFTEN HAVE YOU USED YOUR RESCUE INHALER OR NEBULIZER MEDICATION (SUCH AS ALBUTEROL): NOT AT ALL
QUESTION_2 LAST FOUR WEEKS HOW OFTEN HAVE YOU HAD SHORTNESS OF BREATH: NOT AT ALL
QUESTION_1 LAST FOUR WEEKS HOW MUCH OF THE TIME DID YOUR ASTHMA KEEP YOU FROM GETTING AS MUCH DONE AT WORK, SCHOOL OR AT HOME: NONE OF THE TIME
ACT_TOTALSCORE: 25
QUESTION_5 LAST FOUR WEEKS HOW WOULD YOU RATE YOUR ASTHMA CONTROL: COMPLETELY CONTROLLED
ACT_TOTALSCORE: 25

## 2022-09-23 ENCOUNTER — OFFICE VISIT (OUTPATIENT)
Dept: FAMILY MEDICINE | Facility: CLINIC | Age: 42
End: 2022-09-23
Payer: COMMERCIAL

## 2022-09-23 VITALS
HEART RATE: 83 BPM | WEIGHT: 215.7 LBS | OXYGEN SATURATION: 95 % | SYSTOLIC BLOOD PRESSURE: 111 MMHG | HEIGHT: 62 IN | TEMPERATURE: 97.7 F | DIASTOLIC BLOOD PRESSURE: 78 MMHG | RESPIRATION RATE: 18 BRPM | BODY MASS INDEX: 39.69 KG/M2

## 2022-09-23 DIAGNOSIS — Z00.00 ROUTINE GENERAL MEDICAL EXAMINATION AT A HEALTH CARE FACILITY: Primary | ICD-10-CM

## 2022-09-23 DIAGNOSIS — J45.909 UNCOMPLICATED ASTHMA, UNSPECIFIED ASTHMA SEVERITY, UNSPECIFIED WHETHER PERSISTENT: ICD-10-CM

## 2022-09-23 DIAGNOSIS — E66.01 MORBID OBESITY (H): ICD-10-CM

## 2022-09-23 PROCEDURE — 91312 COVID-19,PF,PFIZER BOOSTER BIVALENT: CPT | Performed by: NURSE PRACTITIONER

## 2022-09-23 PROCEDURE — 0124A COVID-19,PF,PFIZER BOOSTER BIVALENT: CPT | Performed by: NURSE PRACTITIONER

## 2022-09-23 PROCEDURE — 90471 IMMUNIZATION ADMIN: CPT | Performed by: NURSE PRACTITIONER

## 2022-09-23 PROCEDURE — 90686 IIV4 VACC NO PRSV 0.5 ML IM: CPT | Performed by: NURSE PRACTITIONER

## 2022-09-23 PROCEDURE — 99396 PREV VISIT EST AGE 40-64: CPT | Mod: 25 | Performed by: NURSE PRACTITIONER

## 2022-09-23 PROCEDURE — 99214 OFFICE O/P EST MOD 30 MIN: CPT | Mod: 25 | Performed by: NURSE PRACTITIONER

## 2022-09-23 RX ORDER — SEMAGLUTIDE 0.25 MG/.5ML
0.25 INJECTION, SOLUTION SUBCUTANEOUS WEEKLY
Qty: 0.5 ML | Refills: 3 | Status: SHIPPED | OUTPATIENT
Start: 2022-09-23 | End: 2023-01-06

## 2022-09-23 RX ORDER — SEMAGLUTIDE 1.34 MG/ML
0.25 INJECTION, SOLUTION SUBCUTANEOUS
Qty: 1.5 ML | Refills: 0 | Status: SHIPPED | OUTPATIENT
Start: 2022-09-23 | End: 2022-10-12

## 2022-09-23 NOTE — PROGRESS NOTES
SUBJECTIVE:   CC: Nahomi is an 42 year old who presents for preventive health visit.     Patient has been advised of split billing requirements and indicates understanding: Yes  Healthy Habits:     Taking medications regularly:  0    PHQ-2 Total Score: 0  History of Present Illness       Reason for visit:  I'd like to learn if I need to update my IUD, and discuss weight loss medication.    She eats 2-3 servings of fruits and vegetables daily.She consumes 0 sweetened beverage(s) daily.She exercises with enough effort to increase her heart rate 30 to 60 minutes per day.  She exercises with enough effort to increase her heart rate 4 days per week.   She is taking medications regularly.     Would like to get covid booster and flu shot today.       IUD in placed; changed 2021.     Today's PHQ-2 Score:   PHQ-2 ( 1999 Pfizer) 9/22/2022   Q1: Little interest or pleasure in doing things 0   Q2: Feeling down, depressed or hopeless 0   PHQ-2 Score 0   PHQ-2 Total Score (12-17 Years)- Positive if 3 or more points; Administer PHQ-A if positive -   Q1: Little interest or pleasure in doing things Not at all   Q2: Feeling down, depressed or hopeless Not at all   PHQ-2 Score 0     Abuse: Current or Past (Physical, Sexual or Emotional) - No  Do you feel safe in your environment? Yes    Social History     Tobacco Use     Smoking status: Never Smoker     Smokeless tobacco: Never Used   Substance Use Topics     Alcohol use: Yes     Alcohol/week: 1.7 standard drinks     Types: 2 Glasses of wine per week     Alcohol Use 11/22/2020   Prescreen: >3 drinks/day or >7 drinks/week? No   Prescreen: >3 drinks/day or >7 drinks/week? -     Reviewed orders with patient.  Reviewed health maintenance and updated orders accordingly - Yes  Lab work is in process  Labs reviewed in EPIC    Breast Cancer Screening:    FHS-7:   Breast CA Risk Assessment (FHS-7) 10/26/2021   Did any of your first-degree relatives have breast or ovarian cancer? No   Did  any of your relatives have bilateral breast cancer? No   Did any man in your family have breast cancer? No   Did any woman in your family have breast and ovarian cancer? No   Did any woman in your family have breast cancer before age 50 y? No   Do you have 2 or more relatives with breast and/or ovarian cancer? No   Do you have 2 or more relatives with breast and/or bowel cancer? No       Mammogram Screening - Offered annual screening and updated Health Maintenance for mutual plan based on risk factor consideration    Pertinent mammograms are reviewed under the imaging tab.    History of abnormal Pap smear: NO - under age 21, PAP not appropriate for age    PAP / HPV Latest Ref Rng & Units 11/15/2021 4/28/2017 6/20/2014   PAP   Negative for Intraepithelial Lesion or Malignancy (NILM) - -   PAP (Historical) - - NIL NIL   HPV16 Negative Negative Negative -   HPV18 Negative Negative Negative -   HRHPV Negative Negative Negative -     Reviewed and updated as needed this visit by clinical staff   Tobacco  Allergies    Med Hx  Surg Hx  Fam Hx  Soc Hx          Reviewed and updated as needed this visit by Provider                   Past Medical History:   Diagnosis Date     Asthma       Past Surgical History:   Procedure Laterality Date     C IUD,MIRENA  6/2010, 6/17/15     HC TOOTH EXTRACTION W/FORCEP      wisdom teeth extraction     TONSILLECTOMY  age 8       Review of Systems  CONSTITUTIONAL: NEGATIVE for fever, chills, change in weight  INTEGUMENTARU/SKIN: NEGATIVE for worrisome rashes, moles or lesions  EYES: NEGATIVE for vision changes or irritation  ENT: NEGATIVE for ear, mouth and throat problems  RESP: NEGATIVE for significant cough or SOB  BREAST: NEGATIVE for masses, tenderness or discharge  CV: NEGATIVE for chest pain, palpitations or peripheral edema  GI: NEGATIVE for nausea, abdominal pain, heartburn, or change in bowel habits  : NEGATIVE for unusual urinary or vaginal symptoms. Periods are  "regular.  MUSCULOSKELETAL: NEGATIVE for significant arthralgias or myalgia  NEURO: NEGATIVE for weakness, dizziness or paresthesias  PSYCHIATRIC: NEGATIVE for changes in mood or affect     OBJECTIVE:   /78 (BP Location: Right arm, Patient Position: Sitting, Cuff Size: Adult Large)   Pulse 83   Temp 97.7  F (36.5  C) (Oral)   Resp 18   Ht 1.575 m (5' 2\")   Wt 97.8 kg (215 lb 11.2 oz)   SpO2 95%   BMI 39.45 kg/m    Physical Exam  GENERAL: healthy, alert and no distress  EYES: Eyes grossly normal to inspection, PERRL and conjunctivae and sclerae normal  HENT: ear canals and TM's normal, nose and mouth without ulcers or lesions  NECK: no adenopathy, no asymmetry, masses, or scars and thyroid normal to palpation  RESP: lungs clear to auscultation - no rales, rhonchi or wheezes  CV: regular rate and rhythm, normal S1 S2, no S3 or S4, no murmur, click or rub, no peripheral edema and peripheral pulses strong  ABDOMEN: soft, nontender, no hepatosplenomegaly, no masses and bowel sounds normal  MS: no gross musculoskeletal defects noted, no edema  SKIN: no suspicious lesions or rashes  NEURO: Normal strength and tone, mentation intact and speech normal  PSYCH: mentation appears normal, affect normal/bright    Diagnostic Test Results:  Labs reviewed in Epic    ASSESSMENT/PLAN:   Micheline was seen today for physical.    Diagnoses and all orders for this visit:    Routine general medical examination at a health care facility  HM updated.   IUD for contraception.     Morbid obesity (H)  -     Semaglutide-Weight Management (WEGOVY) 0.25 MG/0.5ML SOAJ; Inject 0.25 mg Subcutaneous once a week  -     Comprehensive metabolic panel (BMP + Alb, Alk Phos, ALT, AST, Total. Bili, TP); Future  -     CBC with platelets; Future  -     TSH with free T4 reflex; Future  -     Lipid panel reflex to direct LDL Fasting; Future  Diet and exercise. Discussed medication options per patient request. Started Wegovy. Discussed medication use, " "risks, benefits, and side effects.      Uncomplicated asthma, unspecified asthma severity, unspecified whether persistent  Well controlled. No symptoms or inhaler use.     Other orders  -     Cancel: INFLUENZA VACCINE IM > 6 MONTHS VALENT IIV4 (AFLURIA/FLUZONE)  -     COVID-19,PF,PFIZER BOOSTER BIVALENT (12+YRS)  -     INFLUENZA VACCINE IM > 6 MONTHS VALENT IIV4 (AFLURIA/FLUZONE)        Patient has been advised of split billing requirements and indicates understanding: Yes    COUNSELING:  Reviewed preventive health counseling, as reflected in patient instructions       Regular exercise       Healthy diet/nutrition    Estimated body mass index is 39.45 kg/m  as calculated from the following:    Height as of this encounter: 1.575 m (5' 2\").    Weight as of this encounter: 97.8 kg (215 lb 11.2 oz).    Weight management plan: med started    She reports that she has never smoked. She has never used smokeless tobacco.      Counseling Resources:  ATP IV Guidelines  Pooled Cohorts Equation Calculator  Breast Cancer Risk Calculator  BRCA-Related Cancer Risk Assessment: FHS-7 Tool  FRAX Risk Assessment  ICSI Preventive Guidelines  Dietary Guidelines for Americans, 2010  USDA's MyPlate  ASA Prophylaxis  Lung CA Screening    LORI Allison CNP  [facility]  "

## 2022-10-11 ENCOUNTER — MYC REFILL (OUTPATIENT)
Dept: FAMILY MEDICINE | Facility: CLINIC | Age: 42
End: 2022-10-11

## 2022-10-11 DIAGNOSIS — E66.01 MORBID OBESITY (H): ICD-10-CM

## 2022-10-12 RX ORDER — SEMAGLUTIDE 1.34 MG/ML
0.25 INJECTION, SOLUTION SUBCUTANEOUS
Qty: 1.5 ML | Refills: 0 | OUTPATIENT
Start: 2022-10-12

## 2022-10-12 NOTE — TELEPHONE ENCOUNTER
Refill of semaglutide denied     Refills on file from requesting pharmacy, sent on 9/23/2022    Do Mcclellan, Registered Nurse  [facility]

## 2022-10-28 ENCOUNTER — ANCILLARY PROCEDURE (OUTPATIENT)
Dept: MAMMOGRAPHY | Facility: CLINIC | Age: 42
End: 2022-10-28
Attending: NURSE PRACTITIONER
Payer: COMMERCIAL

## 2022-10-28 DIAGNOSIS — Z12.31 VISIT FOR SCREENING MAMMOGRAM: ICD-10-CM

## 2022-10-28 PROCEDURE — 77063 BREAST TOMOSYNTHESIS BI: CPT | Mod: TC | Performed by: RADIOLOGY

## 2022-10-28 PROCEDURE — 77067 SCR MAMMO BI INCL CAD: CPT | Mod: TC | Performed by: RADIOLOGY

## 2022-11-17 ENCOUNTER — MYC REFILL (OUTPATIENT)
Dept: FAMILY MEDICINE | Facility: CLINIC | Age: 42
End: 2022-11-17

## 2022-11-17 DIAGNOSIS — E66.01 MORBID OBESITY (H): ICD-10-CM

## 2022-11-17 RX ORDER — SEMAGLUTIDE 1.34 MG/ML
0.5 INJECTION, SOLUTION SUBCUTANEOUS
Qty: 1.5 ML | Refills: 0 | Status: CANCELLED | OUTPATIENT
Start: 2022-11-17

## 2022-11-18 DIAGNOSIS — E66.01 MORBID OBESITY (H): ICD-10-CM

## 2022-11-18 NOTE — TELEPHONE ENCOUNTER
Routing refill request to provider for review/approval because:  Labs not current:  A1C - medication being used for weight loss  Lab Results   Component Value Date    A1C 5.7 12/13/2021    A1C 5.4 12/29/2020     Raquel Cruz RN, BSN, PHN  [facility]

## 2022-11-18 NOTE — TELEPHONE ENCOUNTER
Wegovy Weeks Weekly Dose  1 through 4: 0.25 mg  Dose escalation 5 through 8: 0.5 mg  9 through 12: 1 mg  13 through 16: 1.7 mg  Week 17 and onward 2.4 mg Maintenance dose

## 2022-11-21 RX ORDER — SEMAGLUTIDE 1.34 MG/ML
1 INJECTION, SOLUTION SUBCUTANEOUS WEEKLY
Qty: 3 ML | Refills: 0 | Status: SHIPPED | OUTPATIENT
Start: 2022-11-21 | End: 2022-12-19

## 2022-11-21 RX ORDER — SEMAGLUTIDE 1.34 MG/ML
INJECTION, SOLUTION SUBCUTANEOUS
Qty: 1.5 ML | Refills: 0 | OUTPATIENT
Start: 2022-11-21

## 2022-12-16 DIAGNOSIS — E66.01 MORBID OBESITY (H): ICD-10-CM

## 2022-12-16 NOTE — TELEPHONE ENCOUNTER
Routing refill request to provider for review/approval because:  Labs out of range:  A1c  Please update sig    Judie Malcolm RN on 12/16/2022 at 2:47 PM

## 2022-12-19 ENCOUNTER — MYC REFILL (OUTPATIENT)
Dept: FAMILY MEDICINE | Facility: CLINIC | Age: 42
End: 2022-12-19

## 2022-12-19 DIAGNOSIS — E66.01 MORBID OBESITY (H): ICD-10-CM

## 2022-12-19 RX ORDER — SEMAGLUTIDE 1.34 MG/ML
1 INJECTION, SOLUTION SUBCUTANEOUS WEEKLY
Qty: 3 ML | Refills: 0 | Status: SHIPPED | OUTPATIENT
Start: 2022-12-19 | End: 2023-01-06 | Stop reason: DRUGHIGH

## 2022-12-20 RX ORDER — SEMAGLUTIDE 1.34 MG/ML
1 INJECTION, SOLUTION SUBCUTANEOUS WEEKLY
Qty: 3 ML | Refills: 0 | OUTPATIENT
Start: 2022-12-20

## 2022-12-20 NOTE — TELEPHONE ENCOUNTER
This is a duplicate refill request for ozempic.  This was refilled 12/19/22 to the same pharmacy.

## 2023-01-06 ENCOUNTER — MYC REFILL (OUTPATIENT)
Dept: FAMILY MEDICINE | Facility: CLINIC | Age: 43
End: 2023-01-06

## 2023-01-06 ENCOUNTER — OFFICE VISIT (OUTPATIENT)
Dept: FAMILY MEDICINE | Facility: CLINIC | Age: 43
End: 2023-01-06
Attending: NURSE PRACTITIONER
Payer: COMMERCIAL

## 2023-01-06 VITALS
WEIGHT: 205.9 LBS | SYSTOLIC BLOOD PRESSURE: 118 MMHG | TEMPERATURE: 97.6 F | DIASTOLIC BLOOD PRESSURE: 72 MMHG | RESPIRATION RATE: 13 BRPM | HEART RATE: 86 BPM | BODY MASS INDEX: 37.89 KG/M2 | OXYGEN SATURATION: 97 % | HEIGHT: 62 IN

## 2023-01-06 DIAGNOSIS — E66.09 CLASS 2 OBESITY DUE TO EXCESS CALORIES WITHOUT SERIOUS COMORBIDITY WITH BODY MASS INDEX (BMI) OF 37.0 TO 37.9 IN ADULT: ICD-10-CM

## 2023-01-06 DIAGNOSIS — E66.01 MORBID OBESITY (H): ICD-10-CM

## 2023-01-06 DIAGNOSIS — E66.01 MORBID OBESITY (H): Primary | ICD-10-CM

## 2023-01-06 DIAGNOSIS — E66.812 CLASS 2 OBESITY DUE TO EXCESS CALORIES WITHOUT SERIOUS COMORBIDITY WITH BODY MASS INDEX (BMI) OF 37.0 TO 37.9 IN ADULT: ICD-10-CM

## 2023-01-06 LAB
ANION GAP SERPL CALCULATED.3IONS-SCNC: 13 MMOL/L (ref 7–15)
BUN SERPL-MCNC: 9 MG/DL (ref 6–20)
CALCIUM SERPL-MCNC: 9.4 MG/DL (ref 8.6–10)
CHLORIDE SERPL-SCNC: 105 MMOL/L (ref 98–107)
CREAT SERPL-MCNC: 0.88 MG/DL (ref 0.51–0.95)
DEPRECATED HCO3 PLAS-SCNC: 20 MMOL/L (ref 22–29)
GFR SERPL CREATININE-BSD FRML MDRD: 83 ML/MIN/1.73M2
GLUCOSE SERPL-MCNC: 91 MG/DL (ref 70–99)
POTASSIUM SERPL-SCNC: 4.5 MMOL/L (ref 3.4–5.3)
SODIUM SERPL-SCNC: 138 MMOL/L (ref 136–145)

## 2023-01-06 PROCEDURE — 99214 OFFICE O/P EST MOD 30 MIN: CPT | Performed by: NURSE PRACTITIONER

## 2023-01-06 PROCEDURE — 80048 BASIC METABOLIC PNL TOTAL CA: CPT | Performed by: NURSE PRACTITIONER

## 2023-01-06 PROCEDURE — 36415 COLL VENOUS BLD VENIPUNCTURE: CPT | Performed by: NURSE PRACTITIONER

## 2023-01-06 RX ORDER — SEMAGLUTIDE 1.34 MG/ML
1.7 INJECTION, SOLUTION SUBCUTANEOUS WEEKLY
Qty: 3 ML | Refills: 0 | Status: SHIPPED | OUTPATIENT
Start: 2023-01-06 | End: 2023-01-28

## 2023-01-06 RX ORDER — SEMAGLUTIDE 1.7 MG/.75ML
1.7 INJECTION, SOLUTION SUBCUTANEOUS WEEKLY
Qty: 0.75 ML | Refills: 0 | Status: SHIPPED | OUTPATIENT
Start: 2023-01-06 | End: 2023-01-13 | Stop reason: DRUGHIGH

## 2023-01-06 NOTE — PROGRESS NOTES
"  Assessment & Plan     Morbid obesity (H)  Class 2 obesity due to excess calories without serious comorbidity with   Patient is down 10 pound in which patient has not been able to achieve  With diet and exercise.   Feeling well and energy is good  Continued with balanced diet and exercise.   Continue with dose escalation to 1.7mg/week x 4 weeks then 2.4 mg weekly ongoing.   Follow-up 6 months; sooner as needed.   - Basic metabolic panel  (Ca, Cl, CO2, Creat, Gluc, K, Na, BUN)  - Semaglutide-Weight Management (WEGOVY) 1.7 MG/0.75ML SOAJ  Dispense: 0.75 mL; Refill: 0  - Basic metabolic panel  (Ca, Cl, CO2, Creat, Gluc, K, Na, BUN)          No follow-ups on file.      LORI Allison CNP  [facility]    Ghulam Comer is a 42 year old presenting for the following health issues:  Recheck Medication (Ozempic)      History of Present Illness       Reason for visit:  Reema wanted to connect on my Ozempic results thus far.    She eats 2-3 servings of fruits and vegetables daily.She consumes 0 sweetened beverage(s) daily.She exercises with enough effort to increase her heart rate 20 to 29 minutes per day.  She exercises with enough effort to increase her heart rate 4 days per week.   She is taking medications regularly.       Medication Followup of Ozempic    Taking Medication as prescribed: yes    Side Effects:  None    Medication Helping Symptoms:  yes    Doing well with medication and dose escalations.   Is down 10+ pound with recent holiday season.   Good energy.   Continues with balanced food choices and activity.     Review of Systems   Constitutional, HEENT, cardiovascular, pulmonary, gi and gu systems are negative, except as otherwise noted.      Objective    /72 (BP Location: Right arm, Patient Position: Sitting, Cuff Size: Adult Large)   Pulse 86   Temp 97.6  F (36.4  C) (Oral)   Resp 13   Ht 1.575 m (5' 2\")   Wt 93.4 kg (205 lb 14.4 oz)   SpO2 97%   BMI 37.66 " kg/m    Body mass index is 37.66 kg/m .  Physical Exam   GENERAL: healthy, alert and no distress  NECK: no adenopathy, no asymmetry, masses, or scars and thyroid normal to palpation  RESP: regular rate and effort

## 2023-01-09 ENCOUNTER — TELEPHONE (OUTPATIENT)
Dept: FAMILY MEDICINE | Facility: CLINIC | Age: 43
End: 2023-01-09

## 2023-01-09 NOTE — TELEPHONE ENCOUNTER
Prior Authorization Retail Medication Request    Medication/Dose: Semaglutide-Weight Management (WEGOVY) 1.7 MG/0.75ML SOAJ  ICD code (if different than what is on RX):  Morbid obesity (H) [E66.01]  Previously Tried and Failed:  Rationale:      Insurance Name: [employer]  Insurance ID:  [id number]    Pharmacy Information (if different than what is on RX)  Name:  [facility] [place] [address] [address]  Phone:  [phone]

## 2023-01-10 RX ORDER — SEMAGLUTIDE 1.7 MG/.75ML
1.7 INJECTION, SOLUTION SUBCUTANEOUS WEEKLY
Qty: 0.75 ML | Refills: 0 | OUTPATIENT
Start: 2023-01-10

## 2023-01-11 ENCOUNTER — MYC MEDICAL ADVICE (OUTPATIENT)
Dept: FAMILY MEDICINE | Facility: CLINIC | Age: 43
End: 2023-01-11

## 2023-01-11 ENCOUNTER — MYC REFILL (OUTPATIENT)
Dept: FAMILY MEDICINE | Facility: CLINIC | Age: 43
End: 2023-01-11

## 2023-01-11 DIAGNOSIS — E66.01 MORBID OBESITY (H): ICD-10-CM

## 2023-01-11 RX ORDER — SEMAGLUTIDE 1.7 MG/.75ML
1.7 INJECTION, SOLUTION SUBCUTANEOUS WEEKLY
Qty: 0.75 ML | Refills: 0 | Status: CANCELLED | OUTPATIENT
Start: 2023-01-11

## 2023-01-11 NOTE — TELEPHONE ENCOUNTER
Patient comment: Hey there, I received a call back from [facility] regarding the PA you all started.  They mentioend that they didn't receive any details on the PA and asked that you call [phone] to follow up.  THe doctor would like me to be on Wegovy vs. Ozempic due to dosage reasons.  My insurance doesn't want to approve Wegovy (it's not on formulary).  I'd like to ensure that I can get the correct dosage and have it covered.

## 2023-01-12 NOTE — TELEPHONE ENCOUNTER
Central Prior Authorization Team   Phone: [phone]    PA Initiation    Medication: Semaglutide-Weight Management (WEGOVY) 1.7 MG/0.75ML SOAJ  Insurance Company:    Pharmacy Filling the Rx: [place], [place] - [address]  Filling Pharmacy Phone: [phone]  Filling Pharmacy Fax:    Start Date: 1/12/2023

## 2023-01-12 NOTE — TELEPHONE ENCOUNTER
Prior Authorization Approval    Authorization Effective Date: 1/12/2023  Authorization Expiration Date: 7/11/2023  Medication: Semaglutide-Weight Management (WEGOVY) 1.7 MG/0.75ML SOAJ  Approved Dose/Quantity:    Reference #:     Insurance Company:    Expected CoPay:       CoPay Card Available:      Foundation Assistance Needed:    Which Pharmacy is filling the prescription (Not needed for infusion/clinic administered): [facility] [place], [place] - [address] [facility]  Pharmacy Notified: Yes  Patient Notified: Yes  **Instructed pharmacy to notify patient when script is ready to /ship.**

## 2023-02-13 ENCOUNTER — MYC REFILL (OUTPATIENT)
Dept: FAMILY MEDICINE | Facility: CLINIC | Age: 43
End: 2023-02-13
Payer: COMMERCIAL

## 2023-02-13 DIAGNOSIS — E66.01 MORBID OBESITY (H): ICD-10-CM

## 2023-02-13 RX ORDER — SEMAGLUTIDE 2.68 MG/ML
2 INJECTION, SOLUTION SUBCUTANEOUS WEEKLY
Qty: 3 ML | Refills: 0 | Status: CANCELLED | OUTPATIENT
Start: 2023-02-13

## 2023-02-14 RX ORDER — SEMAGLUTIDE 2.68 MG/ML
2 INJECTION, SOLUTION SUBCUTANEOUS
Qty: 9 ML | Refills: 3 | Status: ON HOLD | OUTPATIENT
Start: 2023-02-14 | End: 2023-04-28

## 2023-02-14 RX ORDER — SEMAGLUTIDE 2.68 MG/ML
INJECTION, SOLUTION SUBCUTANEOUS
Qty: 3 ML | Refills: 0 | OUTPATIENT
Start: 2023-02-14

## 2023-02-14 NOTE — TELEPHONE ENCOUNTER
"Routing refill request to provider for review/approval because:  See pt's note below:    \"Patient comment: Confirmed with Insurance that I can't move to Wegovy-as it costs $900 per month after insurance.  I must stay on Ozempic even after PA for Wegovy was approved.  Please renew with Ozempic only. I realize the max Ozem. dose is 2mg vs. Wegovy's dose at 2.4.  I'm fine staying on Ozempic at 2.0.  I'm noticing results such as weight loss (17 pounds) and reduced inflammation. Could you renew for more than one month? \"    Raquel Putnam RN   PAL (Patient [facility]ocate [facility])  [facility]        "

## 2023-02-14 NOTE — TELEPHONE ENCOUNTER
Duplicate request.     Raquel Putnam RN   PAL (Patient Advocate Liason)  [facility]

## 2023-04-26 ENCOUNTER — APPOINTMENT (OUTPATIENT)
Dept: CT IMAGING | Facility: CLINIC | Age: 43
End: 2023-04-26
Attending: EMERGENCY MEDICINE
Payer: COMMERCIAL

## 2023-04-26 ENCOUNTER — HOSPITAL ENCOUNTER (INPATIENT)
Facility: CLINIC | Age: 43
LOS: 2 days | Discharge: HOME | End: 2023-04-28
Attending: EMERGENCY MEDICINE | Admitting: HOSPITALIST
Payer: COMMERCIAL

## 2023-04-26 DIAGNOSIS — E66.01 MORBID OBESITY (H): ICD-10-CM

## 2023-04-26 DIAGNOSIS — R55 SYNCOPE, UNSPECIFIED SYNCOPE TYPE: ICD-10-CM

## 2023-04-26 DIAGNOSIS — K52.9 COLITIS: ICD-10-CM

## 2023-04-26 DIAGNOSIS — R65.10 SIRS (SYSTEMIC INFLAMMATORY RESPONSE SYNDROME) (H): ICD-10-CM

## 2023-04-26 LAB
ABO/RH(D): NORMAL
ALBUMIN SERPL BCG-MCNC: 3.3 G/DL (ref 3.5–5.2)
ALBUMIN UR-MCNC: NEGATIVE MG/DL
ALP SERPL-CCNC: 84 U/L (ref 35–104)
ALT SERPL W P-5'-P-CCNC: 12 U/L (ref 10–35)
AMPHETAMINES UR QL SCN: NORMAL
ANION GAP SERPL CALCULATED.3IONS-SCNC: 16 MMOL/L (ref 7–15)
ANION GAP SERPL CALCULATED.3IONS-SCNC: 6 MMOL/L (ref 7–15)
ANTIBODY SCREEN: NEGATIVE
APPEARANCE UR: CLEAR
AST SERPL W P-5'-P-CCNC: 21 U/L (ref 10–35)
ATRIAL RATE - MUSE: 87 BPM
B-OH-BUTYR SERPL-SCNC: <0.2 MMOL/L
BARBITURATES UR QL SCN: NORMAL
BASE EXCESS BLDV CALC-SCNC: -3.7 MMOL/L (ref -7.7–1.9)
BASOPHILS # BLD MANUAL: 0 10E3/UL (ref 0–0.2)
BASOPHILS NFR BLD MANUAL: 0 %
BENZODIAZ UR QL SCN: NORMAL
BILIRUB SERPL-MCNC: 0.4 MG/DL
BILIRUB UR QL STRIP: NEGATIVE
BUN SERPL-MCNC: 9.1 MG/DL (ref 6–20)
BUN SERPL-MCNC: 9.1 MG/DL (ref 6–20)
BZE UR QL SCN: NORMAL
C COLI+JEJUNI+LARI FUSA STL QL NAA+PROBE: NOT DETECTED
C DIFF TOX B STL QL: NEGATIVE
CALCIUM SERPL-MCNC: 7.6 MG/DL (ref 8.6–10)
CALCIUM SERPL-MCNC: 7.7 MG/DL (ref 8.6–10)
CANNABINOIDS UR QL SCN: NORMAL
CHLORIDE SERPL-SCNC: 107 MMOL/L (ref 98–107)
CHLORIDE SERPL-SCNC: 110 MMOL/L (ref 98–107)
COLOR UR AUTO: ABNORMAL
CREAT SERPL-MCNC: 0.86 MG/DL (ref 0.51–0.95)
CREAT SERPL-MCNC: 0.87 MG/DL (ref 0.51–0.95)
DEPRECATED HCO3 PLAS-SCNC: 14 MMOL/L (ref 22–29)
DEPRECATED HCO3 PLAS-SCNC: 20 MMOL/L (ref 22–29)
DIASTOLIC BLOOD PRESSURE - MUSE: NORMAL MMHG
EC STX1 GENE STL QL NAA+PROBE: NOT DETECTED
EC STX2 GENE STL QL NAA+PROBE: NOT DETECTED
EOSINOPHIL # BLD MANUAL: 0.2 10E3/UL (ref 0–0.7)
EOSINOPHIL NFR BLD MANUAL: 1 %
ERYTHROCYTE [DISTWIDTH] IN BLOOD BY AUTOMATED COUNT: 13.1 % (ref 10–15)
ETHANOL SERPL-MCNC: <0.01 G/DL
GFR SERPL CREATININE-BSD FRML MDRD: 84 ML/MIN/1.73M2
GFR SERPL CREATININE-BSD FRML MDRD: 85 ML/MIN/1.73M2
GLUCOSE SERPL-MCNC: 110 MG/DL (ref 70–99)
GLUCOSE SERPL-MCNC: 232 MG/DL (ref 70–99)
GLUCOSE UR STRIP-MCNC: NEGATIVE MG/DL
HCG SERPL QL: NEGATIVE
HCO3 BLDV-SCNC: 22 MMOL/L (ref 21–28)
HCT VFR BLD AUTO: 49.9 % (ref 35–47)
HGB BLD-MCNC: 12.8 G/DL (ref 11.7–15.7)
HGB BLD-MCNC: 14.8 G/DL (ref 11.7–15.7)
HGB BLD-MCNC: 16.8 G/DL (ref 11.7–15.7)
HGB UR QL STRIP: NEGATIVE
HYALINE CASTS: 2 /LPF
INTERPRETATION ECG - MUSE: NORMAL
KETONES UR STRIP-MCNC: NEGATIVE MG/DL
LACTATE SERPL-SCNC: 1.4 MMOL/L (ref 0.7–2)
LACTATE SERPL-SCNC: 3 MMOL/L (ref 0.7–2)
LACTATE SERPL-SCNC: 3.5 MMOL/L (ref 0.7–2)
LEUKOCYTE ESTERASE UR QL STRIP: NEGATIVE
LYMPHOCYTES # BLD MANUAL: 6.1 10E3/UL (ref 0.8–5.3)
LYMPHOCYTES NFR BLD MANUAL: 25 %
MCH RBC QN AUTO: 29.2 PG (ref 26.5–33)
MCHC RBC AUTO-ENTMCNC: 33.7 G/DL (ref 31.5–36.5)
MCV RBC AUTO: 87 FL (ref 78–100)
MONOCYTES # BLD MANUAL: 0.5 10E3/UL (ref 0–1.3)
MONOCYTES NFR BLD MANUAL: 2 %
MUCOUS THREADS #/AREA URNS LPF: PRESENT /LPF
NEUTROPHILS # BLD MANUAL: 17.6 10E3/UL (ref 1.6–8.3)
NEUTROPHILS NFR BLD MANUAL: 72 %
NITRATE UR QL: NEGATIVE
NOROV GI+II ORF1-ORF2 JNC STL QL NAA+PR: NOT DETECTED
O2/TOTAL GAS SETTING VFR VENT: 0 %
OPIATES UR QL SCN: NORMAL
OXYHGB MFR BLDV: 40 % (ref 70–75)
P AXIS - MUSE: 60 DEGREES
PATH REV: ABNORMAL
PCO2 BLDV: 40 MM HG (ref 40–50)
PH BLDV: 7.34 [PH] (ref 7.32–7.43)
PH UR STRIP: 5 [PH] (ref 5–7)
PLAT MORPH BLD: ABNORMAL
PLATELET # BLD AUTO: 541 10E3/UL (ref 150–450)
PO2 BLDV: 24 MM HG (ref 25–47)
POTASSIUM SERPL-SCNC: 3 MMOL/L (ref 3.4–5.3)
POTASSIUM SERPL-SCNC: 3.9 MMOL/L (ref 3.4–5.3)
PR INTERVAL - MUSE: 142 MS
PROT SERPL-MCNC: 6 G/DL (ref 6.4–8.3)
QRS DURATION - MUSE: 78 MS
QT - MUSE: 394 MS
QTC - MUSE: 474 MS
R AXIS - MUSE: 28 DEGREES
RBC # BLD AUTO: 5.75 10E6/UL (ref 3.8–5.2)
RBC MORPH BLD: ABNORMAL
RBC URINE: 1 /HPF
ROULEAUX BLD QL SMEAR: PRESENT
RVA NSP5 STL QL NAA+PROBE: NOT DETECTED
SALMONELLA SP RPOD STL QL NAA+PROBE: NOT DETECTED
SHIGELLA SP+EIEC IPAH STL QL NAA+PROBE: NOT DETECTED
SMUDGE CELLS BLD QL SMEAR: PRESENT
SODIUM SERPL-SCNC: 136 MMOL/L (ref 136–145)
SODIUM SERPL-SCNC: 137 MMOL/L (ref 136–145)
SP GR UR STRIP: 1.03 (ref 1–1.03)
SPECIMEN EXPIRATION DATE: NORMAL
SQUAMOUS EPITHELIAL: 1 /HPF
SYSTOLIC BLOOD PRESSURE - MUSE: NORMAL MMHG
T AXIS - MUSE: 49 DEGREES
TROPONIN T SERPL HS-MCNC: 7 NG/L
UROBILINOGEN UR STRIP-MCNC: NORMAL MG/DL
V CHOL+PARA RFBL+TRKH+TNAA STL QL NAA+PR: NOT DETECTED
VENTRICULAR RATE- MUSE: 87 BPM
WBC # BLD AUTO: 24.5 10E3/UL (ref 4–11)
WBC URINE: 2 /HPF
Y ENTERO RECN STL QL NAA+PROBE: NOT DETECTED

## 2023-04-26 PROCEDURE — 85027 COMPLETE CBC AUTOMATED: CPT | Performed by: EMERGENCY MEDICINE

## 2023-04-26 PROCEDURE — 36415 COLL VENOUS BLD VENIPUNCTURE: CPT | Performed by: PHYSICIAN ASSISTANT

## 2023-04-26 PROCEDURE — 99223 1ST HOSP IP/OBS HIGH 75: CPT | Mod: AI | Performed by: PHYSICIAN ASSISTANT

## 2023-04-26 PROCEDURE — 70450 CT HEAD/BRAIN W/O DYE: CPT

## 2023-04-26 PROCEDURE — 96375 TX/PRO/DX INJ NEW DRUG ADDON: CPT

## 2023-04-26 PROCEDURE — 250N000013 HC RX MED GY IP 250 OP 250 PS 637: Performed by: PHYSICIAN ASSISTANT

## 2023-04-26 PROCEDURE — 84484 ASSAY OF TROPONIN QUANT: CPT | Performed by: EMERGENCY MEDICINE

## 2023-04-26 PROCEDURE — 96361 HYDRATE IV INFUSION ADD-ON: CPT

## 2023-04-26 PROCEDURE — 83605 ASSAY OF LACTIC ACID: CPT | Performed by: EMERGENCY MEDICINE

## 2023-04-26 PROCEDURE — 36415 COLL VENOUS BLD VENIPUNCTURE: CPT | Performed by: EMERGENCY MEDICINE

## 2023-04-26 PROCEDURE — 250N000009 HC RX 250: Performed by: EMERGENCY MEDICINE

## 2023-04-26 PROCEDURE — 85007 BL SMEAR W/DIFF WBC COUNT: CPT | Performed by: EMERGENCY MEDICINE

## 2023-04-26 PROCEDURE — 258N000003 HC RX IP 258 OP 636: Performed by: EMERGENCY MEDICINE

## 2023-04-26 PROCEDURE — 93005 ELECTROCARDIOGRAM TRACING: CPT

## 2023-04-26 PROCEDURE — 250N000011 HC RX IP 250 OP 636: Performed by: HOSPITALIST

## 2023-04-26 PROCEDURE — 80307 DRUG TEST PRSMV CHEM ANLYZR: CPT | Performed by: EMERGENCY MEDICINE

## 2023-04-26 PROCEDURE — 84703 CHORIONIC GONADOTROPIN ASSAY: CPT | Performed by: EMERGENCY MEDICINE

## 2023-04-26 PROCEDURE — 99285 EMERGENCY DEPT VISIT HI MDM: CPT | Mod: 25

## 2023-04-26 PROCEDURE — 96368 THER/DIAG CONCURRENT INF: CPT

## 2023-04-26 PROCEDURE — 87209 SMEAR COMPLEX STAIN: CPT | Performed by: HOSPITALIST

## 2023-04-26 PROCEDURE — 120N000001 HC R&B MED SURG/OB

## 2023-04-26 PROCEDURE — 80053 COMPREHEN METABOLIC PANEL: CPT | Performed by: EMERGENCY MEDICINE

## 2023-04-26 PROCEDURE — 96376 TX/PRO/DX INJ SAME DRUG ADON: CPT

## 2023-04-26 PROCEDURE — 81001 URINALYSIS AUTO W/SCOPE: CPT | Performed by: EMERGENCY MEDICINE

## 2023-04-26 PROCEDURE — 83605 ASSAY OF LACTIC ACID: CPT | Performed by: PHYSICIAN ASSISTANT

## 2023-04-26 PROCEDURE — 87506 IADNA-DNA/RNA PROBE TQ 6-11: CPT | Performed by: EMERGENCY MEDICINE

## 2023-04-26 PROCEDURE — 82805 BLOOD GASES W/O2 SATURATION: CPT | Performed by: EMERGENCY MEDICINE

## 2023-04-26 PROCEDURE — 96365 THER/PROPH/DIAG IV INF INIT: CPT | Mod: 59

## 2023-04-26 PROCEDURE — 74177 CT ABD & PELVIS W/CONTRAST: CPT

## 2023-04-26 PROCEDURE — 250N000011 HC RX IP 250 OP 636: Performed by: PHYSICIAN ASSISTANT

## 2023-04-26 PROCEDURE — 86901 BLOOD TYPING SEROLOGIC RH(D): CPT | Performed by: PHYSICIAN ASSISTANT

## 2023-04-26 PROCEDURE — 87040 BLOOD CULTURE FOR BACTERIA: CPT | Performed by: EMERGENCY MEDICINE

## 2023-04-26 PROCEDURE — 87493 C DIFF AMPLIFIED PROBE: CPT | Performed by: EMERGENCY MEDICINE

## 2023-04-26 PROCEDURE — 250N000011 HC RX IP 250 OP 636: Performed by: EMERGENCY MEDICINE

## 2023-04-26 PROCEDURE — 82077 ASSAY SPEC XCP UR&BREATH IA: CPT | Performed by: EMERGENCY MEDICINE

## 2023-04-26 PROCEDURE — 85018 HEMOGLOBIN: CPT | Performed by: PHYSICIAN ASSISTANT

## 2023-04-26 PROCEDURE — 82010 KETONE BODYS QUAN: CPT | Performed by: EMERGENCY MEDICINE

## 2023-04-26 RX ORDER — SIMETHICONE 80 MG
80 TABLET,CHEWABLE ORAL EVERY 6 HOURS PRN
Status: DISCONTINUED | OUTPATIENT
Start: 2023-04-26 | End: 2023-04-28 | Stop reason: HOSPADM

## 2023-04-26 RX ORDER — LIDOCAINE 40 MG/G
CREAM TOPICAL
Status: DISCONTINUED | OUTPATIENT
Start: 2023-04-26 | End: 2023-04-28 | Stop reason: HOSPADM

## 2023-04-26 RX ORDER — METRONIDAZOLE 500 MG/1
500 TABLET ORAL 2 TIMES DAILY
Status: DISCONTINUED | OUTPATIENT
Start: 2023-04-26 | End: 2023-04-27

## 2023-04-26 RX ORDER — PROCHLORPERAZINE MALEATE 5 MG
10 TABLET ORAL EVERY 6 HOURS PRN
Status: DISCONTINUED | OUTPATIENT
Start: 2023-04-26 | End: 2023-04-28 | Stop reason: HOSPADM

## 2023-04-26 RX ORDER — OXYCODONE HYDROCHLORIDE 5 MG/1
5 TABLET ORAL EVERY 4 HOURS PRN
Status: DISCONTINUED | OUTPATIENT
Start: 2023-04-26 | End: 2023-04-28 | Stop reason: HOSPADM

## 2023-04-26 RX ORDER — SODIUM CHLORIDE AND POTASSIUM CHLORIDE 150; 900 MG/100ML; MG/100ML
INJECTION, SOLUTION INTRAVENOUS CONTINUOUS
Status: DISCONTINUED | OUTPATIENT
Start: 2023-04-26 | End: 2023-04-27

## 2023-04-26 RX ORDER — LIDOCAINE 40 MG/G
CREAM TOPICAL
Status: DISCONTINUED | OUTPATIENT
Start: 2023-04-26 | End: 2023-04-26

## 2023-04-26 RX ORDER — IOPAMIDOL 755 MG/ML
500 INJECTION, SOLUTION INTRAVASCULAR ONCE
Status: COMPLETED | OUTPATIENT
Start: 2023-04-26 | End: 2023-04-26

## 2023-04-26 RX ORDER — ONDANSETRON 2 MG/ML
4 INJECTION INTRAMUSCULAR; INTRAVENOUS EVERY 6 HOURS PRN
Status: DISCONTINUED | OUTPATIENT
Start: 2023-04-26 | End: 2023-04-28 | Stop reason: HOSPADM

## 2023-04-26 RX ORDER — POTASSIUM CHLORIDE 7.45 MG/ML
10 INJECTION INTRAVENOUS ONCE
Status: COMPLETED | OUTPATIENT
Start: 2023-04-26 | End: 2023-04-26

## 2023-04-26 RX ORDER — ACETAMINOPHEN 325 MG/1
650 TABLET ORAL EVERY 6 HOURS PRN
Status: DISCONTINUED | OUTPATIENT
Start: 2023-04-26 | End: 2023-04-28 | Stop reason: HOSPADM

## 2023-04-26 RX ORDER — ONDANSETRON 4 MG/1
4 TABLET, ORALLY DISINTEGRATING ORAL EVERY 6 HOURS PRN
Status: DISCONTINUED | OUTPATIENT
Start: 2023-04-26 | End: 2023-04-28 | Stop reason: HOSPADM

## 2023-04-26 RX ORDER — ACETAMINOPHEN 650 MG/1
650 SUPPOSITORY RECTAL EVERY 6 HOURS PRN
Status: DISCONTINUED | OUTPATIENT
Start: 2023-04-26 | End: 2023-04-28 | Stop reason: HOSPADM

## 2023-04-26 RX ORDER — HYDROMORPHONE HCL IN WATER/PF 6 MG/30 ML
0.2 PATIENT CONTROLLED ANALGESIA SYRINGE INTRAVENOUS
Status: DISCONTINUED | OUTPATIENT
Start: 2023-04-26 | End: 2023-04-28 | Stop reason: HOSPADM

## 2023-04-26 RX ORDER — PROCHLORPERAZINE 25 MG
25 SUPPOSITORY, RECTAL RECTAL EVERY 12 HOURS PRN
Status: DISCONTINUED | OUTPATIENT
Start: 2023-04-26 | End: 2023-04-28 | Stop reason: HOSPADM

## 2023-04-26 RX ORDER — ONDANSETRON 2 MG/ML
4 INJECTION INTRAMUSCULAR; INTRAVENOUS EVERY 30 MIN PRN
Status: DISCONTINUED | OUTPATIENT
Start: 2023-04-26 | End: 2023-04-26

## 2023-04-26 RX ADMIN — SODIUM CHLORIDE 1000 ML: 9 INJECTION, SOLUTION INTRAVENOUS at 05:36

## 2023-04-26 RX ADMIN — SODIUM CHLORIDE 1000 ML: 9 INJECTION, SOLUTION INTRAVENOUS at 08:38

## 2023-04-26 RX ADMIN — ONDANSETRON 4 MG: 2 INJECTION INTRAMUSCULAR; INTRAVENOUS at 09:58

## 2023-04-26 RX ADMIN — SODIUM CHLORIDE 65 ML: 9 INJECTION, SOLUTION INTRAVENOUS at 09:00

## 2023-04-26 RX ADMIN — METRONIDAZOLE 500 MG: 500 TABLET ORAL at 19:44

## 2023-04-26 RX ADMIN — SODIUM CHLORIDE 1000 ML: 9 INJECTION, SOLUTION INTRAVENOUS at 07:33

## 2023-04-26 RX ADMIN — ONDANSETRON 4 MG: 2 INJECTION INTRAMUSCULAR; INTRAVENOUS at 08:05

## 2023-04-26 RX ADMIN — POTASSIUM CHLORIDE 10 MEQ: 7.46 INJECTION, SOLUTION INTRAVENOUS at 08:38

## 2023-04-26 RX ADMIN — TAZOBACTAM SODIUM AND PIPERACILLIN SODIUM 3.38 G: 375; 3 INJECTION, SOLUTION INTRAVENOUS at 20:54

## 2023-04-26 RX ADMIN — POTASSIUM CHLORIDE AND SODIUM CHLORIDE: 900; 150 INJECTION, SOLUTION INTRAVENOUS at 15:27

## 2023-04-26 RX ADMIN — IOPAMIDOL 100 ML: 755 INJECTION, SOLUTION INTRAVENOUS at 09:00

## 2023-04-26 RX ADMIN — POTASSIUM CHLORIDE AND SODIUM CHLORIDE: 900; 150 INJECTION, SOLUTION INTRAVENOUS at 12:04

## 2023-04-26 RX ADMIN — TAZOBACTAM SODIUM AND PIPERACILLIN SODIUM 3.38 G: 375; 3 INJECTION, SOLUTION INTRAVENOUS at 15:27

## 2023-04-26 RX ADMIN — TAZOBACTAM SODIUM AND PIPERACILLIN SODIUM 4.5 G: 500; 4 INJECTION, SOLUTION INTRAVENOUS at 08:38

## 2023-04-26 ASSESSMENT — ACTIVITIES OF DAILY LIVING (ADL)
ADLS_ACUITY_SCORE: 35

## 2023-04-26 NOTE — PHARMACY-ADMISSION MEDICATION HISTORY
Pharmacist Admission Medication History    Admission medication history is complete. The information provided in this note is only as accurate as the sources available at the time of the update.    Medication reconciliation/reorder completed by provider prior to medication history? No    Information Source(s): Patient and CareEverywhere/SureScripts via in-person    Pertinent Information: None    Changes made to PTA medication list:    Added: None    Deleted: None    Changed: None    Medication Affordability:  Not including over the counter (OTC) medications, was there a time in the past 12 months when you did not take your medications as prescribed because of cost?: No    Allergies reviewed with patient and updates made in EHR: yes    Medication History Completed By: Ollie Nelson RP 4/26/2023 12:21 PM    Prior to Admission medications    Medication Sig Last Dose Taking? Auth Provider Long Term End Date   levonorgestrel (MIRENA) 20 MCG/24HR IUD 1 each by Intrauterine route once Unknown Yes Reported, Patient Yes    Semaglutide, 2 MG/DOSE, (OZEMPIC, 2 MG/DOSE,) 8 MG/3ML pen Inject 2 mg Subcutaneous every 7 days 4/21/2023 at AM Yes Reema Loving APRN CNP

## 2023-04-26 NOTE — ED TRIAGE NOTES
Pt BIBA after passing out while using the bathroom this morning and hitting her head. Pt presents lethargic and slow to respond but oriented. Small cut to the bridge of her nose. EMS reports pt was tachy and hypotensive en route.      Triage Assessment     Row Name 04/26/23 0529       Triage Assessment (Adult)    Airway WDL WDL       Respiratory WDL    Respiratory WDL WDL       Skin Circulation/Temperature WDL    Skin Circulation/Temperature WDL WDL       Cardiac WDL    Cardiac WDL WDL       Peripheral/Neurovascular WDL    Peripheral Neurovascular WDL WDL       Cognitive/Neuro/Behavioral WDL    Cognitive/Neuro/Behavioral WDL X    Level of Consciousness lethargic;somnolent    Arousal Level arouses to voice    Orientation oriented x 4    Speech slow    Mood/Behavior calm;cooperative       Pupils (CN II)    Pupil PERRLA yes       Animas Coma Scale    Best Eye Response 3-->(E3) to speech    Best Motor Response 6-->(M6) obeys commands    Best Verbal Response 5-->(V5) oriented    Aspen Coma Scale Score 14

## 2023-04-26 NOTE — ED NOTES
Patient brought in by EMS near the end of the shift.  Patient confused.  Per report was having a bowel movement and likely fainted while on the toilet.  There is a small abrasion to the bridge of her nose.  Blood sugar was 176.  I reviewed care everywhere and updated the patient's medical history which is minimal.  The patient initially was not responding to voice at the bedside but quickly responded following a light sternal rub.  She was able to state where she was and that her chest hurt.  She appears somewhat confused.  Head CT, EKG, urine and blood work ordered.  Normal saline IV fluid bolus ordered.  Presentation is somewhat concerning for postictal state though no chart history of seizure disorder was found on review.  We will include alcohol level in the blood work.  Work-up initiated for the 6 o'clock physician.    Father later arrived to clarify that the patient likely had several syncopal events, one on the way to the bathroom and another upon trying to stand up.  It sounds like she has had a history of fainting in the past but has never been evaluated for this.  He does not describe any witnessed seizure activity but does describe her as being confused and having some garbled respirations for a short time    Past Medical History:   Diagnosis Date     Asthma      Obesity      Patient Vitals for the past 24 hrs:   BP Pulse Resp SpO2   04/26/23 0528 111/73 87 20 94 %     Nursing note and vitals reviewed.  Constitutional: Laying in bed with eyes closed.  Initially does not respond to voice but quickly responds to sternal rub.  HENT:   Mouth/Throat: Mucous membranes are normal.   Freely moving neck.  Small abrasion to the bridge of the nose.  No deformity.  Eyes: Pupils are equal, round, and reactive to light. Extraocular movements intact  Cardiovascular: Normal rate, regular rhythm and normal heart sounds.  No murmur.  Pulmonary/Chest: Effort normal and breath sounds normal. No respiratory distress.    Abdominal: Soft. Normal appearance. No distension. There is no tenderness.   Musculoskeletal: Normal range of motion without deformity.   Neurological: Alert. GCS 14(M6, V4, E4).  Skin: Skin is warm and dry.    Psychiatric: Unable to assess due to confusion.       Isidro Foote MD  04/26/23 0536       Isidro Foote MD  04/26/23 0558

## 2023-04-26 NOTE — H&P
[facility]    History and Physical - Hospitalist Service       Date of Admission:  4/26/2023    Assessment & Plan Micheline Camargo is a 42 year old female with PMhx of asthma, prediabetes, obesity, who was admitted on 4/26/2023 with colitis.       Lab work notable for elevated lactic acid 3->3.5. Hypokalemia. Anion gap acidosis with glucose of 232. Ketones negative. VBG reassuring. CBC with neutrophilic leukocytosis to 24.5k, hgb 16.8 appears hemo concentrated. UA unremarkable.  Blood cultures x2 drawn and in process.  Enteric bacteria and viral panel pending.  U tox, etoh negative. NCHCT negative. CT abdomen pelvis with contrast showed diffuse inflammatory wall thickening of multiple small bowel loops and portions of the colon consistent with diffuse enteritis and colitis. Small ascites noted. No abscess or free air at this time. No bowel obstruction. Fatty liver.  EKG with NSR.     Colitis, suspected infectious   Inflammatory LGIB   Sepsis   Concern for infectious colitis such as parasitic or Salmonella, camplyobacter, and shigella.  Traveled to [place] in the end of March where she had a self-limited diarrheal illness.  Presented with lower abdominal cramping, nausea and emesis, hematochezia.  Acute onset around 4 AM prior to admission.  Blood pressure softer in ED improved with IV fluids. Exam is benign. Hemoglobin is stable with hemoconcentrated CBC.   -Was given IV Zosyn in the ED.  Continue antibiotics for suspected parasitic or enteric infection with Flagyl PO for intestinal treatment. If clinical worsening could restart broader spectrum IV abx. Lower suspicion for c diff infection.   -Give IV fluids, monitor blood pressure  -Okay for clear liquid diet for now.  Consider advancing if hematochezia is improving  -Follow exam, stool output  -trend hgb, transfuse for hgb <8 if ongoing hematochezia.  Added type and cross this is in process.  -Follow enteric panel testing and ova  and parasites in process.  If all infectious testing is negative consider GI or ID input and colonoscopy otherwise outpatient once resolved  -Analgesia, antiemetics as needed       Metabolic Disturbance with elevated Anion Gap and Lactic Acid   Hypokalemia, mild   Secondary to acute GI losses, is on Ozempic for prediabetes. No DKA, VBG reassuring after IV fluids. Abdominal exam is benign, no current AMS.  -repeat BMP at noon   -if repeat lactic acid is down trending would not repeat unless change in clinical condition as would not .   -iv fluids  -replace potassium     Episodes of Reduced Consciousness  Suspect vasovagal syncopal episode.  Prodromal symptoms.   No risk factors for seizure or history of this. Prior episode of likely vasovagal syncope in history ~3 months ago. Patient's spouse who witnessed event state she had not returned to her baseline mental status between episodes but now has.   -EEG for completeness to follow-up driving and workability recommendations  -Seizure precautions for now, if symptoms recur closely monitor and recheck BP. Would not recommend ativan treatment unless persistent or prolonged episode.   -Would not recommend empiric antiepileptics at this time       Prediabetes  Hepatic steatosis  - hold PTA Ozempic until GI symptoms resolved and tolerating regular diet  - Follow-up with primary care  -Last A1c was well controlled at 5.7 so would not recommend routine AC blood sugar checks at this time can follow glucose with a.m. BMP as long as AG is improving       Diet:  CLD for now    DVT Prophylaxis: Pneumatic Compression Devices  Cherry Catheter: Not present    Cardiac Monitoring: None    Code Status:   Full    Clinically Significant Risk Factors Present on Admission        # Hypokalemia: Lowest K = 3 mmol/L in last 2 days, will replace as needed   # Hypocalcemia: Lowest Ca = 7.7 mg/dL in last 2 days, will monitor and replace as appropriate     # Hypoalbuminemia: Lowest  "albumin = 3.3 g/dL at 4/26/2023  5:36 AM, will monitor as appropriate                  Disposition Plan      Expected Discharge Date: 04/28/2023          To home.     The patient's care was discussed with the Attending Physician, Dr. Luevano, Patient's Family and ED Team.      Stella Abbott PA-C    ______________________________________________________________________    Chief Complaint   \"syncope\"     History is obtained from the patient and patient's spouse who witnessed episodes of LOC.    History of Present Illness   Micheline Camargo is a 42 year old female who was brought to the emergency department from home by EMS after multiple episodes of LOC.    Ms. Camargo awoke abruptly at 0400 with lower abdominal cramping, the urge to have a bowel movement, she became diaphoretic and lightheaded she developed a \"fuzzy \"feeling and had an episode of LOC on her way to the bathroom.  Her spouse found her and assisted her to the bathroom where she had a second episode of similar symptoms while having a bowel movements.  She fell forward hitting her face and lips.  She returned to consciousness and then had a another episode where she was assisted to the floor.  She was breathing during this episode.  Spouse called 911 this all occurred between 420 and 4:50 AM.  It stool incontinence unclear if urine incontinence.  No tongue biting.  No abnormal limb activity noted.  No history of seizure activity or head injury, stroke.    Abdominal discomfort is described in the lower abdomen, cramping sensation that can become sharp.  This started this morning.  She has not eaten today.  She has had associated nausea, an episode of emesis in the ED.  She has had 6 bowel movements today.  They have been loose, 3 of which in the emergency department with bright red blood filling the toilet bowl along with stool.   She reported diarrhea in the end of March while she was traveling in the [place] that did resolve without antibiotic " treatment. No fever.    Hypotensive initially in ED, improved after IV fluids, no fever.    Lab work notable for elevated lactic acid 3->3.5. Hypokalemia. Anion gap acidosis with glucose of 232. Ketones negative. After fluids, VBG reassuring. CBC with neutrophilic leukocytosis to 24.5k, hgb 16.8 appears hemo concentrated. UA unremarkable.  Blood cultures x2 drawn and in process.  Enteric bacteria and viral panel pending.  U tox, etoh negative. NCHCT negative. CT abdomen pelvis with contrast showed diffuse inflammatory wall thickening of multiple small bowel loops and portions of the colon consistent with diffuse enteritis and  colitis. Small ascites noted. No abscess or free air at this time. No bowel obstruction. Fatty liver.  EKG with NSR.       In the ED given IV Zosyn, 3 L NS, IV zofran x2.    Admission was requested from hospitalist service for colitis, syncopal episodes.    Past Medical History    Past Medical History:   Diagnosis Date     Asthma      Obesity        Past Surgical History   Past Surgical History:   Procedure Laterality Date     TONSILLECTOMY  age 8     TOOTH EXTRACTION         Prior to Admission Medications   Prior to Admission Medications   Prescriptions Last Dose Informant Patient Reported? Taking?   Semaglutide, 2 MG/DOSE, (OZEMPIC, 2 MG/DOSE,) 8 MG/3ML pen   No No   Sig: Inject 2 mg Subcutaneous every 7 days   levonorgestrel (MIRENA) 20 MCG/24HR IUD   Yes No   Si each by Intrauterine route once      Facility-Administered Medications: None        Review of Systems    The 10 point Review of Systems is negative other than noted in the HPI or here.     Social History   I have reviewed this patient's social history and updated it with pertinent information if needed. Lives with spouse and daughter, works from home.   Social History     Tobacco Use     Smoking status: Never     Smokeless tobacco: Never   Vaping Use     Vaping status: Never Used   Substance Use Topics     Alcohol use: Yes      Alcohol/week: 1.7 standard drinks of alcohol     Types: 2 Glasses of wine per week     Drug use: No        Physical Exam   Vital Signs: Temp: 97.7  F (36.5  C) Temp src: Oral BP: 128/72 Pulse: 85   Resp: 21 SpO2: 99 % O2 Device: None (Room air)    Weight: 0 lbs 0 oz    Constitutional: Awake, alert,  no apparent distress.  Eyes: Conjunctiva and pupils examined and normal.  HEENT: Moist mucous membranes, normal dentition. Small skin abrasion nasal bridge. No tongue lac.   Respiratory: Clear to auscultation bilaterally, no crackles or wheezing.  Cardiovascular: Regular rate and rhythm, normal S1 and S2, and no murmur noted.  GI: Soft, non-distended, mildly tender to lower quadrants bilaterally, bowel sounds present. No rebound tenderness or guarding.  Lymph/Hematologic: No anterior cervical or supraclavicular adenopathy.  Skin: No rashes, no cyanosis, no edema.  Musculoskeletal: No deformities noted.  No erythema or tenderness. Moving all extremities.  Neurologic: No focal deficits noted. Speech is clear. Coordination and strength grossly normal.   Psychiatric: Appropriate affect.       Medical Decision Making       75 MINUTES SPENT BY ME on the date of service doing chart review, history, exam, documentation & further activities per the note.      Data   ------------------------- PAST 24 HR DATA REVIEWED -----------------------------------------------

## 2023-04-26 NOTE — ED NOTES
[facility]  ED Nurse Handoff Report    Micheline Camargo is a 42 year old female   ED Chief complaint: Syncope  . ED Diagnosis:   Final diagnoses:   None     Allergies: No Known Allergies    Code Status: Full Code  Activity level - Baseline/Home:  Independent. Activity Level - Current:   independent Lift room needed: No. Bariatric: No   Needed: No   Isolation: No. Infection: Not Applicable.     Vital Signs:   Vitals:    04/26/23 0639 04/26/23 0654 04/26/23 0700 04/26/23 0720   BP:  (!) 87/61 (!) 85/65 (!) 87/52   Pulse: 93 91 87 81   Resp:  22 20 17   SpO2: 99% 100% 99% 98%       Cardiac Rhythm:  ,      Pain level:    Patient confused: No. Patient Falls Risk: Yes.   Elimination Status: Has voided   Patient Report - Initial Complaint: Syncope. Focused Assessment: Patient brought in by EMS near the end of the shift.  Patient confused.  Per report was having a bowel movement and likely fainted while on the toilet.  There is a small abrasion to the bridge of her nose.  Blood sugar was 176.  I reviewed care everywhere and updated the patient's medical history which is minimal.  The patient initially was not responding to voice at the bedside but quickly responded following a light sternal rub.  She was able to state where she was and that her chest hurt.  She appears somewhat confused.  Head CT, EKG, urine and blood work ordered.  Normal saline IV fluid bolus ordered.  Presentation is somewhat concerning for postictal state though no chart history of seizure disorder was found on review.  We will include alcohol level in the blood work.  Work-up initiated for the 6 o'clock physician.     Father later arrived to clarify that the patient likely had several syncopal events, one on the way to the bathroom and another upon trying to stand up.  It sounds like she has had a history of fainting in the past but has never been evaluated for this.  He does not describe any witnessed seizure activity but does  describe her as being confused and having some garbled respirations for a short time   Tests Performed: labs, imaging. Abnormal Results:   Labs Ordered and Resulted from Time of ED Arrival to Time of ED Departure   COMPREHENSIVE METABOLIC PANEL - Abnormal       Result Value    Sodium 137      Potassium 3.0 (*)     Chloride 107      Carbon Dioxide (CO2) 14 (*)     Anion Gap 16 (*)     Urea Nitrogen 9.1      Creatinine 0.87      Calcium 7.7 (*)     Glucose 232 (*)     Alkaline Phosphatase 84      AST 21      ALT 12      Protein Total 6.0 (*)     Albumin 3.3 (*)     Bilirubin Total 0.4      GFR Estimate 84     LACTIC ACID WHOLE BLOOD - Abnormal    Lactic Acid 3.0 (*)    CBC WITH PLATELETS AND DIFFERENTIAL - Abnormal    WBC Count 24.5 (*)     RBC Count 5.75 (*)     Hemoglobin 16.8 (*)     Hematocrit 49.9 (*)     MCV 87      MCH 29.2      MCHC 33.7      RDW 13.1      Platelet Count 541 (*)    DIFFERENTIAL - Abnormal    % Neutrophils 72      % Lymphocytes 25      % Monocytes 2      % Eosinophils 1      % Basophils 0      Absolute Neutrophils 17.6 (*)     Absolute Lymphocytes 6.1 (*)     Absolute Monocytes 0.5      Absolute Eosinophils 0.2      Absolute Basophils 0.0      RBC Morphology Confirmed RBC Indices      Platelet Assessment        Value: Automated Count Confirmed. Platelet morphology is normal.    Rouleaux Present (*)     Smudge Cells Present (*)     Pathologist Review Comments       LACTIC ACID WHOLE BLOOD - Abnormal    Lactic Acid 3.5 (*)    TROPONIN T, HIGH SENSITIVITY - Normal    Troponin T, High Sensitivity 7     HCG QUALITATIVE PREGNANCY - Normal    hCG Serum Qualitative Negative     ETHYL ALCOHOL LEVEL - Normal    Alcohol ethyl <0.01     ROUTINE UA WITH MICROSCOPIC   BLOOD GAS VENOUS WITH OXYHEMOGLOBIN   KETONE BETA-HYDROXYBUTYRATE QUANTITATIVE, RAPID   BLOOD CULTURE   BLOOD CULTURE     CT Head w/o Contrast   Final Result   IMPRESSION:   1.  Normal head CT.      CT Abdomen Pelvis w Contrast    (Results  "Pending)     .   Treatments provided: fluids, abx, medications  Family Comments: daughter and  at the bedside and supportive in cares  OBS brochure/video discussed/provided to patient:  No  ED Medications:   Medications   lidocaine 1 % 0.1-1 mL (has no administration in time range)   lidocaine (LMX4) cream (has no administration in time range)   sodium chloride (PF) 0.9% PF flush 3 mL (has no administration in time range)   sodium chloride (PF) 0.9% PF flush 3 mL (has no administration in time range)   piperacillin-tazobactam (ZOSYN) intermittent infusion 4.5 g (4.5 g Intravenous $New Bag 4/26/23 0838)   ondansetron (ZOFRAN) injection 4 mg (4 mg Intravenous $Given 4/26/23 0805)   potassium chloride 10 mEq in 100 mL sterile water infusion (10 mEq Intravenous $New Bag 4/26/23 0838)   0.9% sodium chloride BOLUS (0 mLs Intravenous Stopped 4/26/23 0730)   0.9% sodium chloride BOLUS (1,000 mLs Intravenous $New Bag 4/26/23 0838)   0.9% sodium chloride BOLUS (1,000 mLs Intravenous $New Bag 4/26/23 0733)     Drips infusing:  No  For the majority of the shift, the patient's behavior Green. Interventions performed were n/a.    Sepsis treatment initiated:   Yes    Per the ED Provider, Time Zero for severe sepsis or septic shock is:  0700 (approx)    3 Hour Severe Sepsis Bundle Completion:  1. Initial Lactic Acid Result: Recent Labs   Lab Test 04/26/23  0823 04/26/23  0536   LACT 3.5* 3.0*     2. Blood Cultures before Antibiotics: Yes  3. Broad Spectrum Antibiotics Administered:     Anti-infectives (From now, onward)    Start     Dose/Rate Route Frequency Ordered Stop    04/26/23 0700  piperacillin-tazobactam (ZOSYN) intermittent infusion 4.5 g        Note to Pharmacy: For [facility], [facility] and [facility]: For Zosyn-naive patients, use the \"Zosyn initial dose + extended infusion\" order panel.    4.5 g  200 mL/hr over 30 Minutes Intravenous ONCE 04/26/23 0656          4. 3000 ml of IV fluids have been given so far      6 Hour Severe " Sepsis Bundle Completion:    1. Repeat Lactic Acid Level:   Last result   Lab Results   Component Value Date    LACT 3.5 (H) 04/26/2023     2. Patient currently on Vasopressors =  No     Patient tested for COVID 19 prior to admission: NO    ED Nurse Name/Phone Number: Janine Flores RN,   8:46 AM    RECEIVING UNIT ED HANDOFF REVIEW    Above ED Nurse Handoff Report was reviewed: Yes  Reviewed by: Tracy Faria RN on April 27, 2023 at 5:30 PM

## 2023-04-26 NOTE — PROGRESS NOTES
04/26/23 1515   Child Life   Location ED   Intervention Initial Assessment;Family Support   Family Support Comment Pt has teen daughter     CCLS walked past pt's room and saw teenage young lady in room.  This writer inquired with RN regarding pt's status and then introduced self and services to pt,  and teen daughter.  This writer offered support in the form of information, education and diversional activities to daughter who chose sticker-by-number sheets and water.  This writer encouraged family to reach out should needs arise.

## 2023-04-26 NOTE — H&P
[facility]    History and Physical - Hospitalist Service       Date of Admission:  4/26/2023    Assessment & Plan Micheline Camargo is a 42 year old female with PMhx of asthma, prediabetes, obesity, who was admitted on 4/26/2023 with colitis.       Lab work notable for elevated lactic acid 3->3.5. Hypokalemia. Anion gap acidosis with glucose of 232. Ketones negative. VBG reassuring. CBC with neutrophilic leukocytosis to 24.5k, hgb 16.8 appears hemo concentrated. UA unremarkable.  Blood cultures x2 drawn and in process.  Enteric bacteria and viral panel pending.  U tox, etoh negative. NCHCT negative. CT abdomen pelvis with contrast showed diffuse inflammatory wall thickening of multiple small bowel loops and portions of the colon consistent with diffuse enteritis and colitis. Small ascites noted. No abscess or free air at this time. No bowel obstruction. Fatty liver.  EKG with NSR.     Colitis, suspected infectious   Inflammatory LGIB   Sepsis   Concern for infectious colitis such as parasitic or Salmonella, camplyobacter, and shigella.  Traveled to [place] in the end of March where she had a self-limited diarrheal illness.  Presented with lower abdominal cramping, nausea and emesis, hematochezia.  Acute onset around 4 AM prior to admission.  Blood pressure softer in ED improved with IV fluids. Exam is benign. Hemoglobin is stable with hemoconcentrated CBC.   -Was given IV Zosyn in the ED.  Continue antibiotics with Zosyn for enteric infection and Flagyl PO for intestinal treatment given possible amebiasis. Lower suspicion for c diff infection.   -Give IV fluids, monitor blood pressure  -Okay for clear liquid diet for now.  Consider advancing if hematochezia is improving  -Follow exam, stool output  -trend hgb, transfuse for hgb <8 if ongoing hematochezia.  Added type and cross this is in process.  -Follow enteric panel testing and ova and parasites in process.  If all infectious  testing is negative consider GI or ID input and colonoscopy otherwise outpatient once resolved  -Analgesia, antiemetics as needed       Metabolic Disturbance with elevated Anion Gap and Lactic Acid   Hypokalemia, mild   Secondary to acute GI losses, is on Ozempic for prediabetes. No DKA, VBG reassuring after IV fluids. Abdominal exam is benign, no current AMS.  -repeat BMP at noon   -if repeat lactic acid is down trending would not repeat unless change in clinical condition as would not .   -iv fluids  -replace potassium     Episodes of Reduced Consciousness  Suspect vasovagal syncopal episode.  Prodromal symptoms.   No risk factors for seizure or history of this. Prior episode of likely vasovagal syncope in history ~3 months ago. Patient's spouse who witnessed event state she had not returned to her baseline mental status between episodes but now has.   -EEG for completeness to follow-up driving and workability recommendations  -Seizure precautions for now, if symptoms recur closely monitor and recheck BP. Would not recommend ativan treatment unless persistent or prolonged episode.   -Would not recommend empiric antiepileptics at this time       Prediabetes  Hepatic steatosis  - hold PTA Ozempic until GI symptoms resolved and tolerating regular diet  - Follow-up with primary care  -Last A1c was well controlled at 5.7 so would not recommend routine AC blood sugar checks at this time can follow glucose with a.m. BMP as long as AG is improving       Diet: Clear Liquid DietCLD for now    DVT Prophylaxis: Pneumatic Compression Devices  Cherry Catheter: Not present    Cardiac Monitoring: None    Code Status: Full Code Full    Clinically Significant Risk Factors Present on Admission        # Hypokalemia: Lowest K = 3 mmol/L in last 2 days, will replace as needed   # Hypocalcemia: Lowest Ca = 7.6 mg/dL in last 2 days, will monitor and replace as appropriate     # Hypoalbuminemia: Lowest albumin = 3.3 g/dL  "at 4/26/2023  5:36 AM, will monitor as appropriate                  Disposition Plan      Expected Discharge Date: 04/28/2023          To home.     The patient's care was discussed with the Attending Physician, Dr. Luevano, Patient's Family and ED Team.      Stella Abbott PA-C    ______________________________________________________________________    Chief Complaint   \"syncope\"     History is obtained from the patient and patient's spouse who witnessed episodes of LOC.    History of Present Illness   Micheline Camargo is a 42 year old female who was brought to the emergency department from home by EMS after multiple episodes of LOC.    Ms. Camargo awoke abruptly at 0400 with lower abdominal cramping, the urge to have a bowel movement, she became diaphoretic and lightheaded she developed a \"fuzzy \"feeling and had an episode of LOC on her way to the bathroom.  Her spouse found her and assisted her to the bathroom where she had a second episode of similar symptoms while having a bowel movements.  She fell forward hitting her face and lips.  She returned to consciousness and then had a another episode where she was assisted to the floor.  She was breathing during this episode.  Spouse called 911 this all occurred between 420 and 4:50 AM.  It stool incontinence unclear if urine incontinence.  No tongue biting.  No abnormal limb activity noted.  No history of seizure activity or head injury, stroke.    Abdominal discomfort is described in the lower abdomen, cramping sensation that can become sharp.  This started this morning.  She has not eaten today.  She has had associated nausea, an episode of emesis in the ED.  She has had 6 bowel movements today.  They have been loose, 3 of which in the emergency department with bright red blood filling the toilet bowl along with stool.   She reported diarrhea in the end of March while she was traveling in the [place] that did resolve without antibiotic treatment. No " fever.    Hypotensive initially in ED, improved after IV fluids, no fever.    Lab work notable for elevated lactic acid 3->3.5. Hypokalemia. Anion gap acidosis with glucose of 232. Ketones negative. After fluids, VBG reassuring. CBC with neutrophilic leukocytosis to 24.5k, hgb 16.8 appears hemo concentrated. UA unremarkable.  Blood cultures x2 drawn and in process.  Enteric bacteria and viral panel pending.  U tox, etoh negative. NCHCT negative. CT abdomen pelvis with contrast showed diffuse inflammatory wall thickening of multiple small bowel loops and portions of the colon consistent with diffuse enteritis and  colitis. Small ascites noted. No abscess or free air at this time. No bowel obstruction. Fatty liver.  EKG with NSR.       In the ED given IV Zosyn, 3 L NS, IV zofran x2.    Admission was requested from hospitalist service for colitis, syncopal episodes.    Past Medical History    Past Medical History:   Diagnosis Date     Asthma      Obesity        Past Surgical History   Past Surgical History:   Procedure Laterality Date     TONSILLECTOMY  age 8     TOOTH EXTRACTION         Prior to Admission Medications   Prior to Admission Medications   Prescriptions Last Dose Informant Patient Reported? Taking?   Semaglutide, 2 MG/DOSE, (OZEMPIC, 2 MG/DOSE,) 8 MG/3ML pen 2023 at AM  No Yes   Sig: Inject 2 mg Subcutaneous every 7 days   levonorgestrel (MIRENA) 20 MCG/24HR IUD Unknown  Yes Yes   Si each by Intrauterine route once      Facility-Administered Medications: None        Review of Systems    The 10 point Review of Systems is negative other than noted in the HPI or here.     Social History   I have reviewed this patient's social history and updated it with pertinent information if needed. Lives with spouse and daughter, works from home.   Social History     Tobacco Use     Smoking status: Never     Smokeless tobacco: Never   Vaping Use     Vaping status: Never Used   Substance Use Topics     Alcohol  use: Yes     Alcohol/week: 1.7 standard drinks of alcohol     Types: 2 Glasses of wine per week     Drug use: No        Physical Exam   Vital Signs: Temp: 98.1  F (36.7  C) Temp src: Temporal BP: 119/80 Pulse: 85   Resp: 14 SpO2: 99 % O2 Device: None (Room air)    Weight: 0 lbs 0 oz    Constitutional: Awake, alert,  no apparent distress.  Eyes: Conjunctiva and pupils examined and normal.  HEENT: Moist mucous membranes, normal dentition. Small skin abrasion nasal bridge. No tongue lac.   Respiratory: Clear to auscultation bilaterally, no crackles or wheezing.  Cardiovascular: Regular rate and rhythm, normal S1 and S2, and no murmur noted.  GI: Soft, non-distended, mildly tender to lower quadrants bilaterally, bowel sounds present. No rebound tenderness or guarding.  Lymph/Hematologic: No anterior cervical or supraclavicular adenopathy.  Skin: No rashes, no cyanosis, no edema.  Musculoskeletal: No deformities noted.  No erythema or tenderness. Moving all extremities.  Neurologic: No focal deficits noted. Speech is clear. Coordination and strength grossly normal.   Psychiatric: Appropriate affect.       Medical Decision Making       75 MINUTES SPENT BY ME on the date of service doing chart review, history, exam, documentation & further activities per the note.      Data   ------------------------- PAST 24 HR DATA REVIEWED -----------------------------------------------

## 2023-04-27 LAB
ANION GAP SERPL CALCULATED.3IONS-SCNC: 7 MMOL/L (ref 7–15)
BASOPHILS # BLD AUTO: 0 10E3/UL (ref 0–0.2)
BASOPHILS NFR BLD AUTO: 0 %
BUN SERPL-MCNC: 4.2 MG/DL (ref 6–20)
CALCIUM SERPL-MCNC: 8.2 MG/DL (ref 8.6–10)
CHLORIDE SERPL-SCNC: 111 MMOL/L (ref 98–107)
CREAT SERPL-MCNC: 0.8 MG/DL (ref 0.51–0.95)
DEPRECATED HCO3 PLAS-SCNC: 19 MMOL/L (ref 22–29)
EOSINOPHIL # BLD AUTO: 0.2 10E3/UL (ref 0–0.7)
EOSINOPHIL NFR BLD AUTO: 2 %
ERYTHROCYTE [DISTWIDTH] IN BLOOD BY AUTOMATED COUNT: 13.3 % (ref 10–15)
GFR SERPL CREATININE-BSD FRML MDRD: >90 ML/MIN/1.73M2
GLUCOSE SERPL-MCNC: 92 MG/DL (ref 70–99)
HCT VFR BLD AUTO: 37.4 % (ref 35–47)
HGB BLD-MCNC: 12.6 G/DL (ref 11.7–15.7)
HGB BLD-MCNC: 12.6 G/DL (ref 11.7–15.7)
IMM GRANULOCYTES # BLD: 0 10E3/UL
IMM GRANULOCYTES NFR BLD: 0 %
LYMPHOCYTES # BLD AUTO: 2.6 10E3/UL (ref 0.8–5.3)
LYMPHOCYTES NFR BLD AUTO: 27 %
MCH RBC QN AUTO: 29.2 PG (ref 26.5–33)
MCHC RBC AUTO-ENTMCNC: 33.7 G/DL (ref 31.5–36.5)
MCV RBC AUTO: 87 FL (ref 78–100)
MONOCYTES # BLD AUTO: 0.4 10E3/UL (ref 0–1.3)
MONOCYTES NFR BLD AUTO: 5 %
NEUTROPHILS # BLD AUTO: 6.1 10E3/UL (ref 1.6–8.3)
NEUTROPHILS NFR BLD AUTO: 66 %
NRBC # BLD AUTO: 0 10E3/UL
NRBC BLD AUTO-RTO: 0 /100
O+P STL MICRO: NEGATIVE
PLATELET # BLD AUTO: 350 10E3/UL (ref 150–450)
POTASSIUM SERPL-SCNC: 3.8 MMOL/L (ref 3.4–5.3)
RBC # BLD AUTO: 4.32 10E6/UL (ref 3.8–5.2)
SODIUM SERPL-SCNC: 137 MMOL/L (ref 136–145)
TRI STN SPEC: NORMAL
TRI STN SPEC: NORMAL
WBC # BLD AUTO: 9.4 10E3/UL (ref 4–11)

## 2023-04-27 PROCEDURE — 120N000001 HC R&B MED SURG/OB

## 2023-04-27 PROCEDURE — 250N000011 HC RX IP 250 OP 636: Performed by: PHYSICIAN ASSISTANT

## 2023-04-27 PROCEDURE — 250N000013 HC RX MED GY IP 250 OP 250 PS 637: Performed by: HOSPITALIST

## 2023-04-27 PROCEDURE — 85025 COMPLETE CBC W/AUTO DIFF WBC: CPT | Performed by: PHYSICIAN ASSISTANT

## 2023-04-27 PROCEDURE — 36415 COLL VENOUS BLD VENIPUNCTURE: CPT | Performed by: PHYSICIAN ASSISTANT

## 2023-04-27 PROCEDURE — 36415 COLL VENOUS BLD VENIPUNCTURE: CPT | Performed by: HOSPITALIST

## 2023-04-27 PROCEDURE — 85018 HEMOGLOBIN: CPT | Performed by: HOSPITALIST

## 2023-04-27 PROCEDURE — 99232 SBSQ HOSP IP/OBS MODERATE 35: CPT | Performed by: HOSPITALIST

## 2023-04-27 PROCEDURE — 250N000011 HC RX IP 250 OP 636: Performed by: HOSPITALIST

## 2023-04-27 PROCEDURE — 250N000013 HC RX MED GY IP 250 OP 250 PS 637: Performed by: PHYSICIAN ASSISTANT

## 2023-04-27 PROCEDURE — 80048 BASIC METABOLIC PNL TOTAL CA: CPT | Performed by: PHYSICIAN ASSISTANT

## 2023-04-27 RX ORDER — CIPROFLOXACIN 500 MG/1
500 TABLET, FILM COATED ORAL EVERY 12 HOURS SCHEDULED
Status: DISCONTINUED | OUTPATIENT
Start: 2023-04-27 | End: 2023-04-28 | Stop reason: HOSPADM

## 2023-04-27 RX ADMIN — POTASSIUM CHLORIDE AND SODIUM CHLORIDE: 900; 150 INJECTION, SOLUTION INTRAVENOUS at 01:39

## 2023-04-27 RX ADMIN — POTASSIUM CHLORIDE AND SODIUM CHLORIDE: 900; 150 INJECTION, SOLUTION INTRAVENOUS at 13:06

## 2023-04-27 RX ADMIN — TAZOBACTAM SODIUM AND PIPERACILLIN SODIUM 3.38 G: 375; 3 INJECTION, SOLUTION INTRAVENOUS at 09:47

## 2023-04-27 RX ADMIN — CIPROFLOXACIN 500 MG: 500 TABLET, FILM COATED ORAL at 20:03

## 2023-04-27 RX ADMIN — METRONIDAZOLE 500 MG: 500 TABLET ORAL at 07:58

## 2023-04-27 RX ADMIN — TAZOBACTAM SODIUM AND PIPERACILLIN SODIUM 3.38 G: 375; 3 INJECTION, SOLUTION INTRAVENOUS at 03:05

## 2023-04-27 ASSESSMENT — ACTIVITIES OF DAILY LIVING (ADL)
ADLS_ACUITY_SCORE: 35

## 2023-04-27 NOTE — PROGRESS NOTES
Pt A&Ox4. Up with SBA. Denied pain. Stool sample obtained. IV fluids running. On RA. Tolerating fluids. Denied nausea.     /76   Pulse 86   Temp 98.3  F (36.8  C) (Oral)   Resp 18   SpO2 97%

## 2023-04-27 NOTE — PROGRESS NOTES
Patient Transfer Information  Patient connected to monitoring equipment on arrival: N/A     Patient connected to wall oxygen on arrival: N/A    Belongings: Transferred with patient    Safety check completed: Yes

## 2023-04-27 NOTE — PLAN OF CARE
EEG attempted today but machine was not working. EEG tech called their support and it cannot be fixed until Monday. MD paged.

## 2023-04-27 NOTE — PROGRESS NOTES
[facility]    Hospitalist Progress Note             Date of Admission:  4/26/2023                   Day of hospitalization: 1    Assessment and Plan:   Micheline Camargo is a 42 year old female with PMhx of asthma, prediabetes, obesity, who was admitted on 4/26/2023 with colitis.         Lab work notable for elevated lactic acid 3->3.5. Hypokalemia. Anion gap acidosis with glucose of 232. Ketones negative. VBG reassuring. CBC with neutrophilic leukocytosis to 24.5k, hgb 16.8 appears hemo concentrated. UA unremarkable.  Blood cultures x2 drawn and in process.  Enteric bacteria and viral panel pending.  U tox, etoh negative. NCHCT negative. CT abdomen pelvis with contrast showed diffuse inflammatory wall thickening of multiple small bowel loops and portions of the colon consistent with diffuse enteritis and colitis. Small ascites noted. No abscess or free air at this time. No bowel obstruction. Fatty liver.  EKG with NSR.      Syncope  - Appears vasovagal, monitor on telemetry, no acute events noted    Colitis, suspected infectious   Inflammatory LGIB   Sepsis   Infectious versus medication related patient is on Ozempic  -Discussed with patient about talking to her primary care physician about stopping Ozempic as it can cause gastroenteritis  -Stool studies have been negative C. difficile negative as well  -Started on regular diet today still has bloody bowel movement in the morning we will monitor for the next 24 hours if it persists we will get gastroenterology involvement  -Continue to trend hemoglobin every 12 hours.  Stop Zosyn change to Cipro block     Metabolic Disturbance with elevated Anion Gap and Lactic Acid   Hypokalemia, mild   Secondary to acute GI losses, is on Ozempic for prediabetes. No DKA, VBG reassuring after IV fluids. Abdominal exam is benign, no current AMS.  -Monitor       Prediabetes  Hepatic steatosis  - hold PTA Ozempic until GI symptoms resolved and tolerating regular  diet  - Follow-up with primary care  -Last A1c was well controlled at 5.7 so would not recommend routine AC blood sugar checks at this time can follow glucose with a.m. BMP as long as AG is improving     # Code status: Full    # Anticipated discharge date and Disposition: in 1 day  # DVT: SCDs  # IVF: none                       Columba Luevano MD  Text Page (7am - 6pm, M-F)               Subjective   Chief Complaint:  abdominal pain  Subjective:  Patient states her diarrhea has resolved last bloody bowel movement this morning.  No nausea no vomiting still has abdominal tenderness.          Objective   /75   Pulse 84   Temp 98  F (36.7  C) (Oral)   Resp 15   SpO2 97%      Physical Exam  General: Pt in NAD, normal appearance  HEENT: OP clear MMM, no JVD  Lungs: Clear to Auscultation Bilateral, normal breathing  without accessory muscle usage, no wheezing, rhonchi or crackles  Cardiac: +S1, S2, RRR, no MRG, no edema  Abdominal: normal bowel sounds, NT/ + distention  Skin: warm, dry, normal turgor, no rash  Psyche: A& O x3, appropriate affect             Intake/Output Summary (Last 24 hours) at 4/27/2023 1330  Last data filed at 4/27/2023 0300  Gross per 24 hour   Intake 1396 ml   Output --   Net 1396 ml           Labs and Imaging Results:      Recent Labs   Lab 04/27/23  0759 04/26/23  2102 04/26/23  1222 04/26/23  0536   WBC 9.4  --   --  24.5*   HGB 12.6 12.8   < > 16.8*     --   --  541*    < > = values in this interval not displayed.        Recent Labs   Lab 04/27/23  0759 04/26/23  1222    136   CO2 19* 20*   BUN 4.2* 9.1      No results for input(s): INR, PTT in the last 168 hours.   No results for input(s): CKMB in the last 168 hours.    Invalid input(s): TROPONINT     Recent Labs   Lab 04/26/23  0536   ALBUMIN 3.3*   AST 21   ALT 12   ALKPHOS 84        Micro:     Radio:  CT Abdomen Pelvis w Contrast   Final Result   IMPRESSION:    1.  Diffuse inflammatory wall thickening of multiple small  bowel loops   and portions of the colon consistent with diffuse enteritis and   colitis. Small ascites noted. No abscess or free air at this time. No   bowel obstruction.   2.  Fatty liver.      YAYA GARDINER MD            SYSTEM ID:  [id number]      CT Head w/o Contrast   Final Result   IMPRESSION:   1.  Normal head CT.              Medications:      Scheduled Meds:      ciprofloxacin  500 mg Oral Q12H RUPESH (08/20)     sodium chloride (PF)  3 mL Intracatheter Q8H     Continuous Infusions:      0.9% sodium chloride + KCl 20 mEq/L 100 mL/hr at 04/27/23 1306     PRN Meds:  acetaminophen **OR** acetaminophen, HYDROmorphone, lidocaine 4%, lidocaine (buffered or not buffered), melatonin, ondansetron **OR** ondansetron, oxyCODONE, oxyCODONE IR, prochlorperazine **OR** prochlorperazine **OR** prochlorperazine, simethicone, sodium chloride (PF)

## 2023-04-28 ENCOUNTER — MYC MEDICAL ADVICE (OUTPATIENT)
Dept: FAMILY MEDICINE | Facility: CLINIC | Age: 43
End: 2023-04-28
Payer: COMMERCIAL

## 2023-04-28 VITALS
SYSTOLIC BLOOD PRESSURE: 132 MMHG | WEIGHT: 190 LBS | HEART RATE: 75 BPM | OXYGEN SATURATION: 100 % | RESPIRATION RATE: 18 BRPM | TEMPERATURE: 97.9 F | DIASTOLIC BLOOD PRESSURE: 83 MMHG | BODY MASS INDEX: 34.75 KG/M2

## 2023-04-28 LAB — HGB BLD-MCNC: 12.2 G/DL (ref 11.7–15.7)

## 2023-04-28 PROCEDURE — 36415 COLL VENOUS BLD VENIPUNCTURE: CPT | Performed by: HOSPITALIST

## 2023-04-28 PROCEDURE — 99239 HOSP IP/OBS DSCHRG MGMT >30: CPT | Performed by: HOSPITALIST

## 2023-04-28 PROCEDURE — 85018 HEMOGLOBIN: CPT | Performed by: HOSPITALIST

## 2023-04-28 PROCEDURE — 250N000013 HC RX MED GY IP 250 OP 250 PS 637: Performed by: HOSPITALIST

## 2023-04-28 PROCEDURE — 250N000013 HC RX MED GY IP 250 OP 250 PS 637: Performed by: PHYSICIAN ASSISTANT

## 2023-04-28 RX ORDER — NALOXONE HYDROCHLORIDE 0.4 MG/ML
0.4 INJECTION, SOLUTION INTRAMUSCULAR; INTRAVENOUS; SUBCUTANEOUS
Status: DISCONTINUED | OUTPATIENT
Start: 2023-04-28 | End: 2023-04-28 | Stop reason: HOSPADM

## 2023-04-28 RX ORDER — NALOXONE HYDROCHLORIDE 0.4 MG/ML
0.2 INJECTION, SOLUTION INTRAMUSCULAR; INTRAVENOUS; SUBCUTANEOUS
Status: DISCONTINUED | OUTPATIENT
Start: 2023-04-28 | End: 2023-04-28 | Stop reason: HOSPADM

## 2023-04-28 RX ORDER — CIPROFLOXACIN 500 MG/1
500 TABLET, FILM COATED ORAL EVERY 12 HOURS
Qty: 3 TABLET | Refills: 0 | Status: SHIPPED | OUTPATIENT
Start: 2023-04-28 | End: 2023-04-30

## 2023-04-28 RX ORDER — SEMAGLUTIDE 2.68 MG/ML
2 INJECTION, SOLUTION SUBCUTANEOUS
Qty: 9 ML | Refills: 3
Start: 2023-04-28 | End: 2023-05-03

## 2023-04-28 RX ADMIN — CIPROFLOXACIN 500 MG: 500 TABLET, FILM COATED ORAL at 07:59

## 2023-04-28 RX ADMIN — ACETAMINOPHEN 650 MG: 325 TABLET ORAL at 07:59

## 2023-04-28 ASSESSMENT — ACTIVITIES OF DAILY LIVING (ADL)
ADLS_ACUITY_SCORE: 35

## 2023-04-28 NOTE — TELEPHONE ENCOUNTER
Spoke to patient who reports she is feeling good. Fainting is what pushed her into going to ED for further evaluation over her stomach concerns. Advised patient follow discharge recommendations on when to return to ED, informed RNs available 24/7, if needed. Patient verbalized understanding.     Patient informs discharging provider advised to hold ozempic until follow-up with primary care provider, patient asked if she should hold. RN advised patient follow discharge providers recommendations until she is seen in clinic. Patient verbalized understanding and is agreeable.     Nneka FERNANDO RN, BSN, PHN  [facility]  [phone]

## 2023-04-28 NOTE — PLAN OF CARE
VSS. A/Ox4. LS clear, on RA. Pt denies pain, abd pain or cramping, diarhea, and CP this shift. Got order for tele. SR. Seizure precautions in place. Pt tolerating regular diet without nausea or abd pain.

## 2023-04-28 NOTE — DISCHARGE SUMMARY
Discharge Summary  Hospitalist Service      Micheline Camargo MRN# [id number]   YOB: 1980 Age: 42 year old     Date of Admission:  4/26/2023  Date of Discharge:  4/28/2023  Admitting Physician:  Columba Luevano MD  Discharge Physician: Columba Luevano MD   Discharging Service: Hospitalist Service     Primary Provider: Reema Loving  Primary Care Physician Phone Number: [phone]         Discharge Diagnoses/Problem Oriented Hospital Course (Providers):      Discharge Diagnoses   Syncope  Colitis, suspected infectious   Inflammatory LGIB    Hypokalemia, mild   Hospital Course   Micheline Camargo is a 42 year old female with PMhx of asthma, prediabetes, obesity, who was admitted on 4/26/2023 with colitis.         Lab work notable for elevated lactic acid 3->3.5. Hypokalemia. Anion gap acidosis with glucose of 232. Ketones negative. VBG reassuring. CBC with neutrophilic leukocytosis to 24.5k, hgb 16.8 appears hemo concentrated. UA unremarkable.  Blood cultures x2 drawn and in process.  Enteric bacteria and viral panel pending.  U tox, etoh negative. NCHCT negative. CT abdomen pelvis with contrast showed diffuse inflammatory wall thickening of multiple small bowel loops and portions of the colon consistent with diffuse enteritis and colitis. Small ascites noted. No abscess or free air at this time. No bowel obstruction. Fatty liver.  EKG with NSR.      Syncope  - Appears vasovagal, monitor on telemetry, no acute events noted.  She can consider outpatient EEG after discussion with her primary care provider low suspicion for acute seizure-like activity.     Colitis, suspected infectious   Inflammatory LGIB   Sepsis   Infectious versus medication related patient is on Ozempic  -Discussed with patient about talking to her primary care physician about stopping Ozempic as it can cause gastroenteritis  -Stool studies have been negative C. difficile negative as well  -Discharged with 1 more day of  ciprofloxacin    Metabolic Disturbance with elevated Anion Gap and Lactic Acid   Hypokalemia, mild   Secondary to acute GI losses, is on Ozempic for prediabetes. No DKA, VBG reassuring after IV fluids. Abdominal exam is benign, no current AMS.  -Monitor      Prediabetes  Hepatic steatosis  - hold PTA Ozempic until GI symptoms resolved and tolerating regular diet  - Follow-up with primary care  -Last A1c was well controlled at 5.7               Code Status:      Full Code         Important Results:                  Pending Results:        Unresulted Labs Ordered in the Past 30 Days of this Admission     Date and Time Order Name Status Description    4/26/2023  6:20 AM Blood Culture Hand, Right Preliminary     4/26/2023  6:20 AM Blood Culture Peripheral Blood Preliminary                Discharge Instructions and Follow-Up:      Follow-up Appointments     Follow-up and recommended labs and tests       Follow up with primary care provider, Reema Loving, within 7 days for   hospital follow- up.  No follow up labs or test are needed. Discuss with   provider about resuming ozemic in the setting of gastroenteritis                  Discharge Disposition:        Discharged to home          Discharge Medications:        Current Discharge Medication List      START taking these medications    Details   ciprofloxacin (CIPRO) 500 MG tablet Take 1 tablet (500 mg) by mouth every 12 hours for 2 days  Qty: 3 tablet, Refills: 0    Associated Diagnoses: Colitis         CONTINUE these medications which have CHANGED    Details   Semaglutide, 2 MG/DOSE, (OZEMPIC, 2 MG/DOSE,) 8 MG/3ML pen Inject 2 mg Subcutaneous every 7 days  Qty: 9 mL, Refills: 3    Comments: Please discuss with provider prior to restarting medication.  Associated Diagnoses: Morbid obesity (H)         CONTINUE these medications which have NOT CHANGED    Details   levonorgestrel (MIRENA) 20 MCG/24HR IUD 1 each by Intrauterine route once                  Allergies:        No Known Allergies         Consultations This Hospital Stay:        No consultations were requested during this admission          Condition and Physical Exam on Discharge:        Discharge condition: Stable   Discharge vitals: Blood pressure 132/83, pulse 75, temperature 97.9  F (36.6  C), temperature source Oral, resp. rate 18, weight 86.2 kg (190 lb), SpO2 100 %, not currently breastfeeding.   General: Pt in NAD, normal appearance  HEENT: OP clear MMM, no JVD  Lungs: Clear to Auscultation Bilateral, normal breathing  without accessory muscle usage, no wheezing, rhonchi or crackles  Cardiac: +S1, S2, RRR, no MRG, no edema  Abdominal: normal bowel sounds, NT/ND, no hepatosplenomegaly  Skin: warm, dry, normal turgor, no rash  Psyche: A& O x3, appropriate affect            Discharge Orders for Skilled Facility (from Discharge Orders):        After Care Instructions     Activity      Your activity upon discharge: activity as tolerated         Diet      Follow this diet upon discharge: Orders Placed This Encounter      Regular Diet Adult                    Rehab orders for Skilled Facility (from Discharge Orders):               Discharge Time:      Greater than 30 minutes.        Image Results From This Hospital Stay (For Non-EPIC Providers):        Results for orders placed or performed during the hospital encounter of 04/26/23   CT Head w/o Contrast    Narrative    EXAM: CT HEAD W/O CONTRAST  LOCATION: [facility]  DATE/TIME: 4/26/2023 6:11 AM CDT    INDICATION: Syncope, head injury  COMPARISON: None.  TECHNIQUE: Routine CT Head without IV contrast. Multiplanar reformats. Dose reduction techniques were used.    FINDINGS:  INTRACRANIAL CONTENTS: No intracranial hemorrhage, extraaxial collection, or mass effect.  No CT evidence of acute infarct. Normal parenchymal attenuation. Normal ventricles and sulci.     VISUALIZED ORBITS/SINUSES/MASTOIDS: No intraorbital abnormality. No paranasal sinus  mucosal disease. No middle ear or mastoid effusion.    BONES/SOFT TISSUES: No acute abnormality.      Impression    IMPRESSION:  1.  Normal head CT.   CT Abdomen Pelvis w Contrast    Narrative    CT ABDOMEN AND PELVIS WITH CONTRAST 4/26/2023 9:13 AM    CLINICAL HISTORY: Bloody diarrhea, abdominal pain.    TECHNIQUE: CT scan of the abdomen and pelvis was performed following  injection of IV contrast. Multiplanar reformats were obtained. Dose  reduction techniques were used.    CONTRAST: 100mL Isovue-370    COMPARISON: None.    FINDINGS:   LOWER CHEST: Normal.    HEPATOBILIARY: Suggestion of hepatic steatosis. No worrisome hepatic  acute abnormality identified. Small patchy peripheral hypodensity at  the anterior central liver could just be fatty deposition on series 3  image 38. Gallbladder unremarkable. No biliary ductal dilatation.    PANCREAS: Normal.    SPLEEN: Normal.    ADRENAL GLANDS: Normal.    KIDNEYS/BLADDER: No hydronephrosis or stone. No significant renal or  bladder abnormality.    BOWEL: Multiple prominently thickened small bowel loops noted  diffusely with some fluid distention. No bowel obstruction. There is  also fluid distending the colon along with wall thickening of the  colon, particularly distally. The appendix is normal. Trace ascites.  No free air.    PELVIC ORGANS: Small pelvic free fluid. IUD at the central uterus.  Left ovarian/adnexal cyst is 3.3 cm image 188.    ADDITIONAL FINDINGS: None.    MUSCULOSKELETAL: Normal.      Impression    IMPRESSION:   1.  Diffuse inflammatory wall thickening of multiple small bowel loops  and portions of the colon consistent with diffuse enteritis and  colitis. Small ascites noted. No abscess or free air at this time. No  bowel obstruction.  2.  Fatty liver.    YAYA GARDINER MD         SYSTEM ID:  [id number]           Most Recent Lab Results In EPIC (For Non-EPIC Providers):    Most Recent 3 CBC's:  Recent Labs   Lab Test 04/28/23  0642 04/27/23  0333  04/27/23  0759 04/26/23  1222 04/26/23  0536 11/20/18  1826   WBC  --   --  9.4  --  24.5* 10.0   HGB 12.2 12.6 12.6   < > 16.8* 14.4   MCV  --   --  87  --  87 89   PLT  --   --  350  --  541* 360    < > = values in this interval not displayed.      Most Recent 3 BMP's:  Recent Labs   Lab Test 04/27/23  0759 04/26/23  1222 04/26/23  0536    136 137   POTASSIUM 3.8 3.9 3.0*   CHLORIDE 111* 110* 107   CO2 19* 20* 14*   BUN 4.2* 9.1 9.1   CR 0.80 0.86 0.87   ANIONGAP 7 6* 16*   DARRYL 8.2* 7.6* 7.7*   GLC 92 110* 232*     Most Recent 3 Troponin's:No lab results found.  Most Recent 3 INR's:No lab results found.  Most Recent 2 LFT's:  Recent Labs   Lab Test 04/26/23  0536   AST 21   ALT 12   ALKPHOS 84   BILITOTAL 0.4     Most Recent Cholesterol Panel:  Recent Labs   Lab Test 12/09/20  0848   CHOL 153   LDL 89   HDL 46*   TRIG 89     Most Recent 6 Bacteria Isolates From Any Culture (See EPIC Reports for Culture Details):  Recent Labs   Lab Test 01/06/17  1542   CULT No Beta Streptococcus isolated     Most Recent TSH, T4 and HgbA1c:  Recent Labs   Lab Test 12/13/21  1437 01/22/21  1146   TSH 3.95  --    T4  --  1.01   A1C 5.7*  --

## 2023-04-28 NOTE — PROVIDER NOTIFICATION
"Provider notified: \"Providers note says to monitor pt on tele due to syncopal event. Can we get an order placed please? Thanks!\"  "

## 2023-04-28 NOTE — PROGRESS NOTES
"Discharge instruction reviewed with patient including changes to new medication. All questions answered.     Vital signs:  Temp: 97.9  F (36.6  C) Temp src: Oral BP: 132/83 Pulse: 75   Resp: 18 SpO2: 100 % O2 Device: None (Room air)     Weight: 86.2 kg (190 lb)  Estimated body mass index is 34.75 kg/m  as calculated from the following:    Height as of 1/6/23: 1.575 m (5' 2\").    Weight as of this encounter: 86.2 kg (190 lb).      Tracy Faria RN on 4/28/2023 at 10:42 AM    "

## 2023-04-29 ENCOUNTER — PATIENT OUTREACH (OUTPATIENT)
Dept: CARE COORDINATION | Facility: CLINIC | Age: 43
End: 2023-04-29
Payer: COMMERCIAL

## 2023-04-29 NOTE — PROGRESS NOTES
[facility]    Background: Transitional Care Management program identified per system criteria and reviewed by [facility] team for possible outreach.    Assessment: Upon chart review, CCR Team member will not proceed with patient outreach related to this episode of Transitional Care Management program due to reason below:    Patient has active communication with a nurse, provider or care team for reason of post-hospital follow up plan.  Outreach call by [facility] team not indicated to minimize duplicative efforts.     Plan: Transitional Care Management episode addressed appropriately per reason noted above.      Tamie Kerr MA  [facility] Care Resource [facility]    *Connected Care Resource Team does NOT follow patient ongoing. Referrals are identified based on internal discharge reports and the outreach is to ensure patient has an understanding of their discharge instructions.

## 2023-05-01 LAB
BACTERIA BLD CULT: NO GROWTH
BACTERIA BLD CULT: NO GROWTH

## 2023-05-03 ENCOUNTER — OFFICE VISIT (OUTPATIENT)
Dept: FAMILY MEDICINE | Facility: CLINIC | Age: 43
End: 2023-05-03
Payer: COMMERCIAL

## 2023-05-03 VITALS
OXYGEN SATURATION: 98 % | RESPIRATION RATE: 16 BRPM | BODY MASS INDEX: 35.22 KG/M2 | DIASTOLIC BLOOD PRESSURE: 78 MMHG | HEIGHT: 62 IN | WEIGHT: 191.4 LBS | TEMPERATURE: 98 F | SYSTOLIC BLOOD PRESSURE: 114 MMHG | HEART RATE: 81 BPM

## 2023-05-03 DIAGNOSIS — K52.9 COLITIS: ICD-10-CM

## 2023-05-03 DIAGNOSIS — Z23 ENCOUNTER FOR IMMUNIZATION: ICD-10-CM

## 2023-05-03 DIAGNOSIS — R55 SYNCOPE, UNSPECIFIED SYNCOPE TYPE: Primary | ICD-10-CM

## 2023-05-03 DIAGNOSIS — E66.01 MORBID OBESITY (H): ICD-10-CM

## 2023-05-03 DIAGNOSIS — N83.202 CYST OF LEFT OVARY: ICD-10-CM

## 2023-05-03 PROBLEM — N83.201 CYST OF RIGHT OVARY: Status: ACTIVE | Noted: 2023-05-03

## 2023-05-03 PROCEDURE — 90471 IMMUNIZATION ADMIN: CPT | Performed by: FAMILY MEDICINE

## 2023-05-03 PROCEDURE — 90746 HEPB VACCINE 3 DOSE ADULT IM: CPT | Performed by: FAMILY MEDICINE

## 2023-05-03 PROCEDURE — 99214 OFFICE O/P EST MOD 30 MIN: CPT | Mod: 25 | Performed by: FAMILY MEDICINE

## 2023-05-03 RX ORDER — SEMAGLUTIDE 2.68 MG/ML
1 INJECTION, SOLUTION SUBCUTANEOUS
Qty: 9 ML | Refills: 3
Start: 2023-05-03 | End: 2023-11-01

## 2023-05-03 ASSESSMENT — ASTHMA QUESTIONNAIRES
QUESTION_4 LAST FOUR WEEKS HOW OFTEN HAVE YOU USED YOUR RESCUE INHALER OR NEBULIZER MEDICATION (SUCH AS ALBUTEROL): NOT AT ALL
QUESTION_3 LAST FOUR WEEKS HOW OFTEN DID YOUR ASTHMA SYMPTOMS (WHEEZING, COUGHING, SHORTNESS OF BREATH, CHEST TIGHTNESS OR PAIN) WAKE YOU UP AT NIGHT OR EARLIER THAN USUAL IN THE MORNING: NOT AT ALL
ACT_TOTALSCORE: 25
ACT_TOTALSCORE: 25
QUESTION_5 LAST FOUR WEEKS HOW WOULD YOU RATE YOUR ASTHMA CONTROL: COMPLETELY CONTROLLED
QUESTION_2 LAST FOUR WEEKS HOW OFTEN HAVE YOU HAD SHORTNESS OF BREATH: NOT AT ALL
QUESTION_1 LAST FOUR WEEKS HOW MUCH OF THE TIME DID YOUR ASTHMA KEEP YOU FROM GETTING AS MUCH DONE AT WORK, SCHOOL OR AT HOME: NONE OF THE TIME

## 2023-05-03 NOTE — PROGRESS NOTES
Prior to immunization administration, verified patients identity using patient s name and date of birth. Please see Immunization Activity for additional information.     Screening Questionnaire for Adult Immunization    Are you sick today?   No   Do you have allergies to medications, food, a vaccine component or latex?   No   Have you ever had a serious reaction after receiving a vaccination?   No   Do you have a long-term health problem with heart, lung, kidney, or metabolic disease (e.g., diabetes), asthma, a blood disorder, no spleen, complement component deficiency, a cochlear implant, or a spinal fluid leak?  Are you on long-term aspirin therapy?   No   Do you have cancer, leukemia, HIV/AIDS, or any other immune system problem?   No   Do you have a parent, brother, or sister with an immune system problem?   No   In the past 3 months, have you taken medications that affect  your immune system, such as prednisone, other steroids, or anticancer drugs; drugs for the treatment of rheumatoid arthritis, Crohn s disease, or psoriasis; or have you had radiation treatments?   No   Have you had a seizure, or a brain or other nervous system problem?   No   During the past year, have you received a transfusion of blood or blood    products, or been given immune (gamma) globulin or antiviral drug?   No   For women: Are you pregnant or is there a chance you could become       pregnant during the next month?   No   Have you received any vaccinations in the past 4 weeks?   No     Immunization questionnaire answers were all negative.      Injection of hep b given by Janine Almeida CMA. Patient instructed to remain in clinic for 15 minutes afterwards, and to report any adverse reactions.     Screening performed by Janine Almeida CMA on 5/3/2023 at 4:41 PM.

## 2023-05-03 NOTE — PROGRESS NOTES
"  Assessment & Plan   Problem List Items Addressed This Visit     Colitis     Neutrophils in stool, , CT with thickened colonic wall. otherwise eval unrevealing. All sx resolved. Re challenge with semaglutide desired by pt. She wonders about colonoscopy. Discussed screening, diagnostic. Does not appear that colonoscopy is urgent, ca screening discussed. Re consider 3 mos.         Cyst of left ovary     Non descript. Discussed blasé radiology comments, ovulation physiology. Reassurance, Reimage prn,          Encounter for immunization     She has received 1 Hep B. Discussed, offered         Relevant Orders    HEPATITIS B VACCINE, ADULT, IM (Completed)    Morbid obesity (H)     Pt liked subjective response to semaglutide. Resume 1/2 last dose         Relevant Medications    Semaglutide, 2 MG/DOSE, (OZEMPIC, 2 MG/DOSE,) 8 MG/3ML pen    Syncope, unspecified syncope type - Primary     Vasovagal, telemtry reaasuring EEG. [place] un necessary. I concur. Pt likes this recommendation                     MED REC REQUIRED  Post Medication Reconciliation Status:   BMI:   Estimated body mass index is 35.01 kg/m  as calculated from the following:    Height as of this encounter: 1.575 m (5' 2\").    Weight as of this encounter: 86.8 kg (191 lb 6.4 oz).           Gray Murrieta MD  [facility]    Ghulam Comer is a 42 year old, presenting for the following health issues:  Hospital F/U        5/3/2023     4:05 PM   Additional Questions   Roomed by Radha Hickey     [facility]       Hospital Follow-up Visit:    Hospital/Nursing Home/IP Rehab Facility: [facility]  Date of Admission: 4/26/23  Date of Discharge: 4/28/23  Reason(s) for Admission: Syncope  Colitis, suspected infectious   Inflammatory LGIB    Hypokalemia, mild     Was your hospitalization related to COVID-19? No   Problems taking medications regularly:  None  Medication changes since discharge: has not been taking " "Ozempic  Problems adhering to non-medication therapy:  None    Summary of hospitalization:  [facility] discharge summary reviewed  Diagnostic Tests/Treatments reviewed.  Follow up needed: none  Other Healthcare Providers Involved in Patient s Care:         None  Update since discharge: improved.   Plan of care communicated with patient           Review of Systems   All sx resolved      Objective    /78 (BP Location: Right arm, Patient Position: Sitting, Cuff Size: Adult Regular)   Pulse 81   Temp 98  F (36.7  C) (Oral)   Resp 16   Ht 1.575 m (5' 2\")   Wt 86.8 kg (191 lb 6.4 oz)   SpO2 98%   BMI 35.01 kg/m    Body mass index is 35.01 kg/m .  Physical Exam   ABDOMEN without organomegaly nor tenderness to palpation  Gray Murrieta MD                      "

## 2023-05-04 NOTE — ASSESSMENT & PLAN NOTE
Neutrophils in stool, , CT with thickened colonic wall. otherwise eval unrevealing. All sx resolved. Re challenge with semaglutide desired by pt. She wonders about colonoscopy. Discussed screening, diagnostic. Does not appear that colonoscopy is urgent, ca screening discussed. Re consider 3 mos.

## 2023-05-04 NOTE — ASSESSMENT & PLAN NOTE
Non descript. Discussed blasé radiology comments, ovulation physiology. Reassurance, Reimage prn,

## 2023-05-31 ENCOUNTER — ALLIED HEALTH/NURSE VISIT (OUTPATIENT)
Dept: FAMILY MEDICINE | Facility: CLINIC | Age: 43
End: 2023-05-31
Payer: COMMERCIAL

## 2023-05-31 DIAGNOSIS — Z23 NEED FOR HEPATITIS VACCINATION: Primary | ICD-10-CM

## 2023-05-31 PROCEDURE — 99207 PR NO CHARGE NURSE ONLY: CPT

## 2023-05-31 PROCEDURE — 90471 IMMUNIZATION ADMIN: CPT

## 2023-05-31 PROCEDURE — 90746 HEPB VACCINE 3 DOSE ADULT IM: CPT

## 2023-05-31 NOTE — PROGRESS NOTES
Prior to immunization administration, verified patients identity using patient s name and date of birth. Please see Immunization Activity for additional information.     Screening Questionnaire for Adult Immunization    Are you sick today?   No   Do you have allergies to medications, food, a vaccine component or latex?   No   Have you ever had a serious reaction after receiving a vaccination?   No   Do you have a long-term health problem with heart, lung, kidney, or metabolic disease (e.g., diabetes), asthma, a blood disorder, no spleen, complement component deficiency, a cochlear implant, or a spinal fluid leak?  Are you on long-term aspirin therapy?   No   Do you have cancer, leukemia, HIV/AIDS, or any other immune system problem?   No   Do you have a parent, brother, or sister with an immune system problem?   No   In the past 3 months, have you taken medications that affect  your immune system, such as prednisone, other steroids, or anticancer drugs; drugs for the treatment of rheumatoid arthritis, Crohn s disease, or psoriasis; or have you had radiation treatments?   No   Have you had a seizure, or a brain or other nervous system problem?   No   During the past year, have you received a transfusion of blood or blood    products, or been given immune (gamma) globulin or antiviral drug?   No   For women: Are you pregnant or is there a chance you could become       pregnant during the next month?   No   Have you received any vaccinations in the past 4 weeks?   No     Immunization questionnaire answers were all negative.    I have reviewed the following standing orders:   This patient is due and qualifies for the Hepatitis B vaccine.    Click here for Hepatitis B Standing Order    I have reviewed the vaccines inclusion and exclusion criteria; No concerns regarding eligibility.         Injection of Hep B given by Janine Almeida CMA. Patient instructed to remain in clinic for 15 minutes afterwards, and to report any  adverse reactions.     Screening performed by Janine Almeida CMA on 5/31/2023 at 8:55 AM.

## 2023-06-08 ENCOUNTER — TRANSFERRED RECORDS (OUTPATIENT)
Dept: HEALTH INFORMATION MANAGEMENT | Facility: CLINIC | Age: 43
End: 2023-06-08
Payer: COMMERCIAL

## 2023-06-28 ENCOUNTER — VIRTUAL VISIT (OUTPATIENT)
Dept: FAMILY MEDICINE | Facility: CLINIC | Age: 43
End: 2023-06-28
Payer: COMMERCIAL

## 2023-06-28 ENCOUNTER — APPOINTMENT (OUTPATIENT)
Dept: LAB | Facility: CLINIC | Age: 43
End: 2023-06-28
Payer: COMMERCIAL

## 2023-06-28 DIAGNOSIS — T88.7XXA MEDICATION SIDE EFFECTS: ICD-10-CM

## 2023-06-28 DIAGNOSIS — K52.9 COLITIS: ICD-10-CM

## 2023-06-28 DIAGNOSIS — E66.01 MORBID OBESITY (H): Primary | ICD-10-CM

## 2023-06-28 LAB
ALBUMIN SERPL BCG-MCNC: 4 G/DL (ref 3.5–5.2)
ALP SERPL-CCNC: 77 U/L (ref 35–104)
ALT SERPL W P-5'-P-CCNC: 13 U/L (ref 0–50)
ANION GAP SERPL CALCULATED.3IONS-SCNC: 11 MMOL/L (ref 7–15)
AST SERPL W P-5'-P-CCNC: 20 U/L (ref 0–45)
BILIRUB SERPL-MCNC: 0.4 MG/DL
BUN SERPL-MCNC: 8.2 MG/DL (ref 6–20)
CALCIUM SERPL-MCNC: 9 MG/DL (ref 8.6–10)
CHLORIDE SERPL-SCNC: 105 MMOL/L (ref 98–107)
CREAT SERPL-MCNC: 0.86 MG/DL (ref 0.51–0.95)
DEPRECATED HCO3 PLAS-SCNC: 22 MMOL/L (ref 22–29)
ERYTHROCYTE [DISTWIDTH] IN BLOOD BY AUTOMATED COUNT: 12.9 % (ref 10–15)
GFR SERPL CREATININE-BSD FRML MDRD: 85 ML/MIN/1.73M2
GLUCOSE SERPL-MCNC: 98 MG/DL (ref 70–99)
HCT VFR BLD AUTO: 42.6 % (ref 35–47)
HGB BLD-MCNC: 14.2 G/DL (ref 11.7–15.7)
LIPASE SERPL-CCNC: 40 U/L (ref 13–60)
MCH RBC QN AUTO: 29.3 PG (ref 26.5–33)
MCHC RBC AUTO-ENTMCNC: 33.3 G/DL (ref 31.5–36.5)
MCV RBC AUTO: 88 FL (ref 78–100)
PLATELET # BLD AUTO: 341 10E3/UL (ref 150–450)
POTASSIUM SERPL-SCNC: 4 MMOL/L (ref 3.4–5.3)
PROT SERPL-MCNC: 6.9 G/DL (ref 6.4–8.3)
RBC # BLD AUTO: 4.84 10E6/UL (ref 3.8–5.2)
SODIUM SERPL-SCNC: 138 MMOL/L (ref 136–145)
WBC # BLD AUTO: 7.2 10E3/UL (ref 4–11)

## 2023-06-28 PROCEDURE — 36415 COLL VENOUS BLD VENIPUNCTURE: CPT | Mod: VID | Performed by: NURSE PRACTITIONER

## 2023-06-28 PROCEDURE — 85027 COMPLETE CBC AUTOMATED: CPT | Mod: VID | Performed by: NURSE PRACTITIONER

## 2023-06-28 PROCEDURE — 99214 OFFICE O/P EST MOD 30 MIN: CPT | Mod: VID | Performed by: NURSE PRACTITIONER

## 2023-06-28 PROCEDURE — 80053 COMPREHEN METABOLIC PANEL: CPT | Mod: VID | Performed by: NURSE PRACTITIONER

## 2023-06-28 PROCEDURE — 83690 ASSAY OF LIPASE: CPT | Mod: VID | Performed by: NURSE PRACTITIONER

## 2023-06-28 NOTE — PROGRESS NOTES
"Nahomi is a 43 year old who is being evaluated via a billable video visit.      How would you like to obtain your AVS? MyChart  If the video visit is dropped, the invitation should be resent by: Text to cell phone: [phone]  Will anyone else be joining your video visit? No          Assessment & Plan     Morbid obesity (H)  Colitis  Medication side effects  Patient feeling well with current dose of medication. Had an episode of slight diarrhea and upset stomach, not comparable to episode with hospitalization; has made correlation to eating and drinking poorly with episode.   Discussed updating labs to check for signs of dehydration.   Will be fully mindful of eating and drinking. Recommended 120 oz per day  Continue Wegovy at 1 mg/day.   Follow-up 3 month; sooner as needed.   - CBC with platelets  - Comprehensive metabolic panel (BMP + Alb, Alk Phos, ALT, AST, Total. Bili, TP)  - Lipase    BMI:   Estimated body mass index is 35.01 kg/m  as calculated from the following:    Height as of 5/3/23: 1.575 m (5' 2\").    Weight as of 5/3/23: 86.8 kg (191 lb 6.4 oz).           LORI [facility]    Subjective   Nahomi is a 43 year old, presenting for the following health issues:  Medication Problem    HPI     Medication Followup of Ozempic    Taking Medication as prescribed: yes    Side Effects:  GI concerns    Medication Helping Symptoms:  yes    Follow-up to continued use of Wegovy after enteritis.   Resumed medication at half dose (1 mg) and doing well   Has made a correlation of these side effects and when she is not drinking enough water and eating a poor meal choice.   Otherwise, feels great and has had weight loss.     Review of Systems   Constitutional, HEENT, cardiovascular, pulmonary, gi and gu systems are negative, except as otherwise noted.      Objective           Vitals:  No vitals were obtained today due to virtual visit.    Physical Exam   GENERAL: Healthy, " alert and no distress  EYES: Eyes grossly normal to inspection.  No discharge or erythema, or obvious scleral/conjunctival abnormalities.  RESP: No audible wheeze, cough, or visible cyanosis.  No visible retractions or increased work of breathing.    SKIN: Visible skin clear. No significant rash, abnormal pigmentation or lesions.  NEURO: Cranial nerves grossly intact.  Mentation and speech appropriate for age.  PSYCH: Mentation appears normal, affect normal/bright, judgement and insight intact, normal speech and appearance well-groomed.                Video-Visit Details    Type of service:  Video Visit     Originating Location (pt. Location): Home    Distant Location (provider location):  Off-site  Platform used for Video Visit: Jp

## 2023-07-25 ENCOUNTER — MYC MEDICAL ADVICE (OUTPATIENT)
Dept: FAMILY MEDICINE | Facility: CLINIC | Age: 43
End: 2023-07-25
Payer: COMMERCIAL

## 2023-07-26 NOTE — TELEPHONE ENCOUNTER
See maria c, recommended appointment.     Nneka FERNANDO RN, BSN, PHN  [facility]  [phone]

## 2023-08-29 ENCOUNTER — OFFICE VISIT (OUTPATIENT)
Dept: FAMILY MEDICINE | Facility: CLINIC | Age: 43
End: 2023-08-29
Payer: COMMERCIAL

## 2023-08-29 VITALS
BODY MASS INDEX: 34.04 KG/M2 | SYSTOLIC BLOOD PRESSURE: 113 MMHG | OXYGEN SATURATION: 99 % | HEART RATE: 98 BPM | HEIGHT: 62 IN | RESPIRATION RATE: 16 BRPM | TEMPERATURE: 98 F | DIASTOLIC BLOOD PRESSURE: 80 MMHG | WEIGHT: 185 LBS

## 2023-08-29 DIAGNOSIS — N63.15 MASS OVERLAPPING MULTIPLE QUADRANTS OF RIGHT BREAST: Primary | ICD-10-CM

## 2023-08-29 PROCEDURE — 99213 OFFICE O/P EST LOW 20 MIN: CPT | Performed by: STUDENT IN AN ORGANIZED HEALTH CARE EDUCATION/TRAINING PROGRAM

## 2023-08-29 NOTE — PROGRESS NOTES
"  Assessment & Plan     Mass overlapping multiple quadrants of right breast  - MA Diagnostic Digital Bilateral  - US Breast Right Limited 1-3 Quadrants    [facility] NORMA Comer is a 43 year old, presenting for the following health issues:  Breast Problem        8/29/2023     8:58 AM   Additional Questions   Roomed by Naveed VILLANUEVA CMA       History of Present Illness       Reason for visit:  Breast tissue has changed.  (I have fibrous tissue and it seems to have changed).  My mammogram isn't until October-want to check this out prior to that appt.  Symptom onset:  More than a month  Symptoms include:  Breast tissue, no pain  Symptom intensity:  Mild  Symptom progression:  Staying the same  Had these symptoms before:  No    She eats 2-3 servings of fruits and vegetables daily.She consumes 0 sweetened beverage(s) daily.She exercises with enough effort to increase her heart rate 20 to 29 minutes per day.  She exercises with enough effort to increase her heart rate 4 days per week.   She is taking medications regularly.     Patient has a history of dense fibrocystic breast tissue, with intermittent breast tenderness fluctuating throughout the month (she does not get her period due to having an IUD). Her last screening mammogram in October 2022 was negative. 6 weeks ago she palpated a slightly tender firm lump in her mid-outer right breast that felt different than the surrounding tissue. The lump was slightly tender to touch at first, but not really tender anymore. No associated skin changes or nipple changes     Review of Systems   As noted above in HPI.        Objective    /80 (BP Location: Right arm, Patient Position: Sitting, Cuff Size: Adult Regular)   Pulse 98   Temp 98  F (36.7  C) (Oral)   Resp 16   Ht 1.575 m (5' 2\")   Wt 83.9 kg (185 lb)   SpO2 99%   BMI 33.84 kg/m    Body mass index is 33.84 kg/m .  Physical Exam   GENERAL APPEARANCE: healthy, " alert, and no distress  BREAST: no palpable axillary masses or adenopathy, fibrocystic changes bilateral, and mass right breast around the 9 o clock position   LYMPHATICS: no cervical adenopathy  SKIN: no suspicious lesions or rashes

## 2023-09-05 ENCOUNTER — ANCILLARY ORDERS (OUTPATIENT)
Dept: FAMILY MEDICINE | Facility: CLINIC | Age: 43
End: 2023-09-05

## 2023-09-05 ENCOUNTER — HOSPITAL ENCOUNTER (OUTPATIENT)
Dept: ULTRASOUND IMAGING | Facility: CLINIC | Age: 43
Discharge: HOME | End: 2023-09-05
Attending: STUDENT IN AN ORGANIZED HEALTH CARE EDUCATION/TRAINING PROGRAM
Payer: COMMERCIAL

## 2023-09-05 ENCOUNTER — HOSPITAL ENCOUNTER (OUTPATIENT)
Dept: MAMMOGRAPHY | Facility: CLINIC | Age: 43
Discharge: HOME | End: 2023-09-05
Attending: STUDENT IN AN ORGANIZED HEALTH CARE EDUCATION/TRAINING PROGRAM
Payer: COMMERCIAL

## 2023-09-05 DIAGNOSIS — N63.15 MASS OVERLAPPING MULTIPLE QUADRANTS OF RIGHT BREAST: ICD-10-CM

## 2023-09-05 DIAGNOSIS — N63.15 MASS OVERLAPPING MULTIPLE QUADRANTS OF RIGHT BREAST: Primary | ICD-10-CM

## 2023-09-05 PROCEDURE — 76642 ULTRASOUND BREAST LIMITED: CPT | Mod: RT

## 2023-09-05 PROCEDURE — 77066 DX MAMMO INCL CAD BI: CPT

## 2023-09-06 ENCOUNTER — HOSPITAL ENCOUNTER (OUTPATIENT)
Dept: ULTRASOUND IMAGING | Facility: CLINIC | Age: 43
Discharge: HOME | End: 2023-09-06
Attending: STUDENT IN AN ORGANIZED HEALTH CARE EDUCATION/TRAINING PROGRAM
Payer: COMMERCIAL

## 2023-09-06 ENCOUNTER — HOSPITAL ENCOUNTER (OUTPATIENT)
Dept: MAMMOGRAPHY | Facility: CLINIC | Age: 43
Discharge: HOME | End: 2023-09-06
Attending: STUDENT IN AN ORGANIZED HEALTH CARE EDUCATION/TRAINING PROGRAM
Payer: COMMERCIAL

## 2023-09-06 DIAGNOSIS — N63.15 MASS OVERLAPPING MULTIPLE QUADRANTS OF RIGHT BREAST: ICD-10-CM

## 2023-09-06 PROCEDURE — 88305 TISSUE EXAM BY PATHOLOGIST: CPT | Mod: TC | Performed by: STUDENT IN AN ORGANIZED HEALTH CARE EDUCATION/TRAINING PROGRAM

## 2023-09-06 PROCEDURE — 88305 TISSUE EXAM BY PATHOLOGIST: CPT | Mod: 26 | Performed by: STUDENT IN AN ORGANIZED HEALTH CARE EDUCATION/TRAINING PROGRAM

## 2023-09-06 PROCEDURE — 999N000065 MA POST PROCEDURE RIGHT

## 2023-09-06 PROCEDURE — 250N000009 HC RX 250: Performed by: RADIOLOGY

## 2023-09-06 PROCEDURE — A4648 IMPLANTABLE TISSUE MARKER: HCPCS

## 2023-09-06 RX ADMIN — LIDOCAINE HYDROCHLORIDE 5 ML: 10 INJECTION, SOLUTION EPIDURAL; INFILTRATION; INTRACAUDAL; PERINEURAL at 10:32

## 2023-09-06 NOTE — DISCHARGE INSTRUCTIONS

## 2023-09-07 DIAGNOSIS — N63.15 MASS OVERLAPPING MULTIPLE QUADRANTS OF RIGHT BREAST: Primary | ICD-10-CM

## 2023-09-07 LAB
PATH REPORT.COMMENTS IMP SPEC: NORMAL
PATH REPORT.FINAL DX SPEC: NORMAL
PATH REPORT.GROSS SPEC: NORMAL
PATH REPORT.MICROSCOPIC SPEC OTHER STN: NORMAL
PATH REPORT.RELEVANT HX SPEC: NORMAL
PHOTO IMAGE: NORMAL

## 2023-09-07 NOTE — PROGRESS NOTES
Call placed to Micheline Camargo.   verified.    Patient has been notified pathology results from her 2023 RIGHT BREAST BIOPSY.      Final Diagnosis  A. Breast, right, 10:00, 8 cm from nipple, 1.4 cm size, biopsy:  -Sclerotic breast tissue with focal microcalcification, see comment.  Electronically signed by Tracy Leon MD on 2023 at 11:06 AM  Comment:  There is no evidence of a mass forming lesion. The breast tissue is densely sclerotic. No evidence of atypia or malignancy.  Prospective intradepartmental consultation concurs with the diagnosis.    Per [facility] radiologist Dr. Natanael Dowell, results are concordant with imaging findings.    Recommendation: 6 month follow up ultrasound Right Breast    Naohmi denies any post biopsy site issues. I encouraged her to notify her doctor if any breast changes or concerns arise.    Petty NETTLESN, RN  Procedure Nurse  [facility]  [phone]

## 2023-10-02 ENCOUNTER — MYC MEDICAL ADVICE (OUTPATIENT)
Dept: FAMILY MEDICINE | Facility: CLINIC | Age: 43
End: 2023-10-02
Payer: COMMERCIAL

## 2023-11-01 ENCOUNTER — OFFICE VISIT (OUTPATIENT)
Dept: FAMILY MEDICINE | Facility: CLINIC | Age: 43
End: 2023-11-01
Payer: COMMERCIAL

## 2023-11-01 VITALS
HEART RATE: 81 BPM | HEIGHT: 62 IN | SYSTOLIC BLOOD PRESSURE: 105 MMHG | BODY MASS INDEX: 34.23 KG/M2 | DIASTOLIC BLOOD PRESSURE: 75 MMHG | RESPIRATION RATE: 23 BRPM | OXYGEN SATURATION: 98 % | TEMPERATURE: 98.1 F | WEIGHT: 186 LBS

## 2023-11-01 DIAGNOSIS — R73.03 PREDIABETES: ICD-10-CM

## 2023-11-01 DIAGNOSIS — Z00.00 ROUTINE GENERAL MEDICAL EXAMINATION AT A HEALTH CARE FACILITY: Primary | ICD-10-CM

## 2023-11-01 DIAGNOSIS — E66.01 MORBID OBESITY (H): ICD-10-CM

## 2023-11-01 PROBLEM — E66.09 CLASS 2 OBESITY DUE TO EXCESS CALORIES WITHOUT SERIOUS COMORBIDITY WITH BODY MASS INDEX (BMI) OF 39.0 TO 39.9 IN ADULT: Status: RESOLVED | Noted: 2020-11-23 | Resolved: 2023-11-01

## 2023-11-01 PROBLEM — E66.812 CLASS 2 OBESITY DUE TO EXCESS CALORIES WITHOUT SERIOUS COMORBIDITY WITH BODY MASS INDEX (BMI) OF 39.0 TO 39.9 IN ADULT: Status: RESOLVED | Noted: 2020-11-23 | Resolved: 2023-11-01

## 2023-11-01 LAB
CHOLEST SERPL-MCNC: 127 MG/DL
HBA1C MFR BLD: 5.2 % (ref 0–5.6)
HDLC SERPL-MCNC: 46 MG/DL
LDLC SERPL CALC-MCNC: 59 MG/DL
NONHDLC SERPL-MCNC: 81 MG/DL
TRIGL SERPL-MCNC: 110 MG/DL

## 2023-11-01 PROCEDURE — 90472 IMMUNIZATION ADMIN EACH ADD: CPT | Performed by: NURSE PRACTITIONER

## 2023-11-01 PROCEDURE — 80061 LIPID PANEL: CPT | Performed by: NURSE PRACTITIONER

## 2023-11-01 PROCEDURE — 90686 IIV4 VACC NO PRSV 0.5 ML IM: CPT | Performed by: NURSE PRACTITIONER

## 2023-11-01 PROCEDURE — 90471 IMMUNIZATION ADMIN: CPT | Performed by: NURSE PRACTITIONER

## 2023-11-01 PROCEDURE — 36415 COLL VENOUS BLD VENIPUNCTURE: CPT | Performed by: NURSE PRACTITIONER

## 2023-11-01 PROCEDURE — 90677 PCV20 VACCINE IM: CPT | Performed by: NURSE PRACTITIONER

## 2023-11-01 PROCEDURE — 90746 HEPB VACCINE 3 DOSE ADULT IM: CPT | Performed by: NURSE PRACTITIONER

## 2023-11-01 PROCEDURE — 99396 PREV VISIT EST AGE 40-64: CPT | Mod: 25 | Performed by: NURSE PRACTITIONER

## 2023-11-01 PROCEDURE — 83036 HEMOGLOBIN GLYCOSYLATED A1C: CPT | Performed by: NURSE PRACTITIONER

## 2023-11-01 RX ORDER — SEMAGLUTIDE 2.68 MG/ML
1 INJECTION, SOLUTION SUBCUTANEOUS
Qty: 4.5 ML | Refills: 3 | Status: SHIPPED | OUTPATIENT
Start: 2023-11-01 | End: 2023-11-09

## 2023-11-01 ASSESSMENT — ENCOUNTER SYMPTOMS
ARTHRALGIAS: 0
ABDOMINAL PAIN: 0
DIZZINESS: 0
WEAKNESS: 0
DIARRHEA: 0
BRUISES/BLEEDS EASILY: 0
HEADACHES: 0
PALPITATIONS: 0
PARESTHESIAS: 0
MYALGIAS: 0
PSYCHIATRIC NEGATIVE: 1
EYE PAIN: 0
FREQUENCY: 0
SORE THROAT: 0
CHILLS: 0
DYSURIA: 0
COUGH: 0
CONSTIPATION: 0
SHORTNESS OF BREATH: 0
ALLERGIC/IMMUNOLOGIC NEGATIVE: 1
FEVER: 0

## 2023-11-01 ASSESSMENT — ASTHMA QUESTIONNAIRES
QUESTION_3 LAST FOUR WEEKS HOW OFTEN DID YOUR ASTHMA SYMPTOMS (WHEEZING, COUGHING, SHORTNESS OF BREATH, CHEST TIGHTNESS OR PAIN) WAKE YOU UP AT NIGHT OR EARLIER THAN USUAL IN THE MORNING: NOT AT ALL
QUESTION_4 LAST FOUR WEEKS HOW OFTEN HAVE YOU USED YOUR RESCUE INHALER OR NEBULIZER MEDICATION (SUCH AS ALBUTEROL): NOT AT ALL
ACT_TOTALSCORE: 25
QUESTION_1 LAST FOUR WEEKS HOW MUCH OF THE TIME DID YOUR ASTHMA KEEP YOU FROM GETTING AS MUCH DONE AT WORK, SCHOOL OR AT HOME: NONE OF THE TIME
ACT_TOTALSCORE: 25
QUESTION_5 LAST FOUR WEEKS HOW WOULD YOU RATE YOUR ASTHMA CONTROL: COMPLETELY CONTROLLED
QUESTION_2 LAST FOUR WEEKS HOW OFTEN HAVE YOU HAD SHORTNESS OF BREATH: NOT AT ALL

## 2023-11-01 ASSESSMENT — PAIN SCALES - GENERAL: PAINLEVEL: NO PAIN (0)

## 2023-11-01 NOTE — LETTER
November 1, 2023      Micheline Camargo  [address] [facility] [address]              To Whom It May Concern:    Micheline Camargo has elevated glucose with elevated hemoglobin A1c.  She has been maintained on metformin since 2020 and then switched to ozempic 9/2022.  The patient has benefitted from this medication significantly in managing elevated blood sugar.      Sincerely,      Lara OSEGUERA

## 2023-11-01 NOTE — ASSESSMENT & PLAN NOTE
On metformin, then switched to ozempic 9/2022.  Is tolerating 2 mg dose and has had significant weight loss with use.  Glucoses have been within normal limits.  Needs A1c recheck today.   Letter for insurance.

## 2023-11-01 NOTE — PROGRESS NOTES
SUBJECTIVE:   CC: Nahomi is an 43 year old who presents for preventive health visit.       11/1/2023     8:58 AM   Additional Questions   Roomed by Lilian AREVALO MA   Accompanied by Self         11/1/2023     8:58 AM   Patient Reported Additional Medications   Patient reports taking the following new medications None       Prediabetes  On metformin, then switched to ozempic 9/2022.  Is tolerating 2 mg dose and has had significant weight loss with use.  Glucoses have been within normal limits.  Needs A1c recheck today.   Letter for insurance.      History of Present Illness       Diabetes:   She presents for follow up of diabetes.    She is not checking blood glucose.        She is concerned about other.    She is not experiencing numbness or burning in feet, excessive thirst, blurry vision, weight changes or redness, sores or blisters on feet.           Reason for visit:  Medication review, diabetes review, general appt    She eats 2-3 servings of fruits and vegetables daily.She consumes 0 sweetened beverage(s) daily.She exercises with enough effort to increase her heart rate 20 to 29 minutes per day.  She exercises with enough effort to increase her heart rate 4 days per week.   She is taking medications regularly.    Have you ever done Advance Care Planning? (For example, a Health Directive, POLST, or a discussion with a medical provider or your loved ones about your wishes): No, advance care planning information given to patient to review.  Patient declined advance care planning discussion at this time.    Social History     Tobacco Use    Smoking status: Never    Smokeless tobacco: Never   Substance Use Topics    Alcohol use: Yes     Alcohol/week: 1.7 standard drinks of alcohol     Types: 2 Glasses of wine per week           11/22/2020    12:24 PM   Alcohol Use   Prescreen: >3 drinks/day or >7 drinks/week? No     Reviewed orders with patient.  Reviewed health maintenance and updated orders accordingly -  "Yes      Breast Cancer Screenin/1/2023     8:41 AM   Breast CA Risk Assessment (FHS-7)   Do you have a family history of breast, colon, or ovarian cancer? No / Unknown         Pertinent mammograms are reviewed under the imaging tab.    History of abnormal Pap smear: NO - age 30-65 PAP every 5 years with negative HPV co-testing recommended      Latest Ref Rng & Units 11/15/2021    11:16 AM 2017    10:49 AM 2017     9:30 AM   PAP / HPV   PAP  Negative for Intraepithelial Lesion or Malignancy (NILM)      PAP (Historical)   NIL     HPV 16 DNA Negative Negative   Negative    HPV 18 DNA Negative Negative   Negative    Other HR HPV Negative Negative   Negative      Reviewed and updated as needed this visit by clinical staff   Tobacco  Allergies  Meds  Problems  Med Hx  Surg Hx  Fam Hx          Reviewed and updated as needed this visit by Provider   Tobacco  Allergies  Meds  Problems  Med Hx  Surg Hx  Fam Hx             Review of Systems   Constitutional:  Negative for chills and fever.   HENT:  Negative for congestion, ear pain and sore throat.    Eyes:  Negative for pain and visual disturbance.   Respiratory:  Negative for cough and shortness of breath.    Cardiovascular:  Negative for chest pain, palpitations and peripheral edema.   Gastrointestinal:  Negative for abdominal pain, constipation and diarrhea.   Endocrine: Negative for polyuria.   Genitourinary:  Negative for dysuria, frequency and vaginal discharge.   Musculoskeletal:  Negative for arthralgias and myalgias.   Skin:  Negative for rash.   Allergic/Immunologic: Negative.    Neurological:  Negative for dizziness, weakness, headaches and paresthesias.   Hematological:  Does not bruise/bleed easily.   Psychiatric/Behavioral: Negative.            OBJECTIVE:   /75 (BP Location: Right arm, Patient Position: Sitting, Cuff Size: Adult Large)   Pulse 81   Temp 98.1  F (36.7  C) (Oral)   Resp 23   Ht 1.575 m (5' 2.01\")   Wt " 84.4 kg (186 lb)   SpO2 98%   BMI 34.01 kg/m    Physical Exam  Constitutional:       General: She is not in acute distress.     Appearance: Normal appearance. She is well-developed.   HENT:      Head: Normocephalic.      Right Ear: Tympanic membrane normal.      Left Ear: Tympanic membrane normal.      Mouth/Throat:      Mouth: Mucous membranes are moist.   Eyes:      Conjunctiva/sclera: Conjunctivae normal.   Cardiovascular:      Rate and Rhythm: Normal rate and regular rhythm.      Heart sounds: Normal heart sounds.   Pulmonary:      Effort: Pulmonary effort is normal.      Breath sounds: Normal breath sounds.   Abdominal:      General: Bowel sounds are normal.      Palpations: Abdomen is soft. There is no mass.      Tenderness: There is no abdominal tenderness.   Musculoskeletal:      Cervical back: Neck supple.   Lymphadenopathy:      Cervical: No cervical adenopathy.   Skin:     General: Skin is warm and dry.      Findings: No rash.   Neurological:      General: No focal deficit present.      Mental Status: She is alert and oriented to person, place, and time.   Psychiatric:         Mood and Affect: Mood normal.         Thought Content: Thought content normal.         Judgment: Judgment normal.               ASSESSMENT/PLAN:       ICD-10-CM    1. Routine general medical examination at a health care facility  Z00.00       2. Prediabetes  R73.03 Hemoglobin A1c     Lipid Profile (Chol, Trig, HDL, LDL calc)      3. Morbid obesity (H)  E66.01 Semaglutide, 2 MG/DOSE, (OZEMPIC, 2 MG/DOSE,) 8 MG/3ML pen          Wellness exam  Prediabetes:   Working on diet and exercise  Needs authorization for continued use of ozempic  Obesity:  Improving.      COUNSELING:  Reviewed preventive health counseling, as reflected in patient instructions       Regular exercise       Healthy diet/nutrition      BMI:   Estimated body mass index is 34.01 kg/m  as calculated from the following:    Height as of this encounter: 1.575 m (5'  "2.01\").    Weight as of this encounter: 84.4 kg (186 lb).   Weight management plan: continue ozempic as allowed by insurance      She reports that she has never smoked. She has never used smokeless tobacco.          LORI Caban [facility]  "

## 2023-11-06 ENCOUNTER — TELEPHONE (OUTPATIENT)
Dept: FAMILY MEDICINE | Facility: CLINIC | Age: 43
End: 2023-11-06

## 2023-11-06 NOTE — TELEPHONE ENCOUNTER
Prior Authorization Retail Medication Request    Medication/Dose: Semaglutide, 2 MG/DOSE, (OZEMPIC, 2 MG/DOSE,) 8 MG/3ML pen  ICD code (if different than what is on RX):    Morbid obesity (H) [E66.01]        Prediabetes-R73.03     Previously Tried and Failed:    Rationale:      Insurance Name:  [employer]  Insurance ID:        Pharmacy Information (if different than what is on RX)  Name:    Phone:

## 2023-11-08 NOTE — TELEPHONE ENCOUNTER
Prior Authorization Not Needed per Insurance    Medication: OZEMPIC (2 MG/DOSE) 8 MG/3ML SC SOPN  Insurance Company: [employer] [place] - Phone [phone] Fax [phone]  Expected CoPay: $    Pharmacy Filling the Rx: [facility] [place] [place] - [address]  Pharmacy Notified: y  Patient Notified: y    Pharmacy confirmed no PA will be needed if patient is meant to take the 2mg dose, but the script is written incorrectly so they will clarify and profile.

## 2023-11-09 ENCOUNTER — MYC MEDICAL ADVICE (OUTPATIENT)
Dept: FAMILY MEDICINE | Facility: CLINIC | Age: 43
End: 2023-11-09
Payer: COMMERCIAL

## 2023-11-09 DIAGNOSIS — E66.01 MORBID OBESITY (H): ICD-10-CM

## 2023-11-09 DIAGNOSIS — J45.20 MILD INTERMITTENT ASTHMA, UNSPECIFIED WHETHER COMPLICATED: ICD-10-CM

## 2023-11-09 RX ORDER — ALBUTEROL SULFATE 90 UG/1
2 AEROSOL, METERED RESPIRATORY (INHALATION) EVERY 6 HOURS PRN
Qty: 6.7 G | Refills: 1 | Status: SHIPPED | OUTPATIENT
Start: 2023-11-09

## 2023-11-09 RX ORDER — SEMAGLUTIDE 2.68 MG/ML
2 INJECTION, SOLUTION SUBCUTANEOUS
Qty: 4.5 ML | Refills: 3 | Status: SHIPPED | OUTPATIENT
Start: 2023-11-09 | End: 2023-11-13

## 2023-11-09 NOTE — TELEPHONE ENCOUNTER
Gage Mercado Ea/Rm17 hours ago (3:30 PM)       Hello! PA is not needed if patient is meant to take the 2mg dose, however script is written for patient to take a 1mg dose with the 2mg dose pen, and it is written for 4.5mg when each pen is 3mg. Pharmacy will be reaching out to clarify intended dose. You may close the encounter when done. Thank you!

## 2023-11-10 NOTE — TELEPHONE ENCOUNTER
Please validate that this is corrected for pt.  Current Rx shows 2 mg weekly.    Thanks    Sunitha Solano

## 2023-11-13 ENCOUNTER — MYC MEDICAL ADVICE (OUTPATIENT)
Dept: FAMILY MEDICINE | Facility: CLINIC | Age: 43
End: 2023-11-13
Payer: COMMERCIAL

## 2023-11-13 DIAGNOSIS — E66.01 MORBID OBESITY (H): ICD-10-CM

## 2023-11-13 RX ORDER — SEMAGLUTIDE 2.68 MG/ML
2 INJECTION, SOLUTION SUBCUTANEOUS
Qty: 4.5 ML | Refills: 3 | Status: SHIPPED | OUTPATIENT
Start: 2023-11-13 | End: 2023-12-14

## 2023-12-14 ENCOUNTER — TELEPHONE (OUTPATIENT)
Dept: FAMILY MEDICINE | Facility: CLINIC | Age: 43
End: 2023-12-14

## 2023-12-14 ENCOUNTER — MYC REFILL (OUTPATIENT)
Dept: FAMILY MEDICINE | Facility: CLINIC | Age: 43
End: 2023-12-14
Payer: COMMERCIAL

## 2023-12-14 DIAGNOSIS — E66.01 MORBID OBESITY (H): ICD-10-CM

## 2023-12-14 RX ORDER — SEMAGLUTIDE 2.68 MG/ML
2 INJECTION, SOLUTION SUBCUTANEOUS
Qty: 4.5 ML | Refills: 3 | Status: SHIPPED | OUTPATIENT
Start: 2023-12-14

## 2023-12-14 NOTE — TELEPHONE ENCOUNTER
Prior Authorization Retail Medication Request    Medication/Dose: Semaglutide, 2 MG/DOSE, (OZEMPIC, 2 MG/DOSE,) 8 MG/3ML pen    Primary: [facility] OUT OF STATE  Insurance ID: [id number]        *PA was initiated in November and not needed per encounter if doseage was changed.  Pharmacy sent fax w/updated prescription stating PA required*

## 2023-12-14 NOTE — LETTER
December 20, 2023      Re: Micheline Camargo  [address]  [facility] [address]            To Whom It May Concern:    Micheline Camargo is taking ozempic 2 mg weekly for prediabetes along with also taking metformin in the past.  Hemoglobin A1c elevated to 5.7.  Medication is helping with this condition and would like Prior Authorization to continue the medication.      Sincerely,      Lara PENNYNP

## 2023-12-19 NOTE — TELEPHONE ENCOUNTER
----- Message from Yadi Breaux sent at 7/20/2020 12:28 PM PDT -----  Patient was seen in ER on 07-19-20 for fever..  The patient still has a fever of 101 and Mom gives medicine to get the fever down, but it comes right back again. Negative on COVID-19 in household.    PA Initiation    Medication: OZEMPIC (2 MG/DOSE) 8 MG/3ML SC SOPN  Insurance Company: Other (see comments)Comment:  [facility]  Pharmacy Filling the Rx: [facility] [place] - [address]  Filling Pharmacy Phone: [phone]  Filling Pharmacy Fax: [phone]  Start Date: 12/19/2023

## 2023-12-20 NOTE — TELEPHONE ENCOUNTER
Gage Mercado Ea/Rm1 minute ago (11:19 AM)       Hello, the PA for this medication was denied. Please read the encounter for more details. If the provider would like to appeal, please provide a letter of medical necessity and route back to the PA team. Otherwise, please advise patient on next steps and you may close the encounter. Thank you!

## 2023-12-21 NOTE — TELEPHONE ENCOUNTER
Medication Appeal Initiation    Medication: OZEMPIC (2 MG/DOSE) 8 MG/3ML SC SOPN  Appeal Start Date:  12/21/2023  Insurance Company: [employer]  Insurance Phone: [employer]  Insurance Fax: [employer]  Comments:  [employer]    Sent to plan via fax

## 2023-12-27 NOTE — TELEPHONE ENCOUNTER
Spoke with plan, advised request was received. Case is under review, determination can take 30 days

## 2024-01-05 NOTE — TELEPHONE ENCOUNTER
Spoke with plan rep advised we were supposed to have been sent a letter that appeals are not accepted via fax. Must start verbally. Called member services who gave me a different fax number to send the appeal to [phone] and assured that it was fine to send a fax.

## 2024-01-11 NOTE — TELEPHONE ENCOUNTER
Spoke with plan, confirmed receipt of request. Determination can take up to 30 days. Case ID [id number]

## 2024-02-02 NOTE — TELEPHONE ENCOUNTER
Gage Mercado Ea/Rm7 minutes ago (9:32 AM)       Hello, the denial was upheld on the appeal for this medication. Second level appeals will be managed by the clinic staff and provider. Please contact the [facility] Prior Authorization Team if additional information about the denial is needed. You may close this encounter when done.      Note: Due to record-high volumes, our turn-around is time taking longer than normal . We are currently 8 days behind in the pools.  We are working diligently to submit all requests in a timely manner and in the order they are received. Please only flag true urgent requests as high priority to the pool at this time.  If you have questions - please send a note/message in the active PA encounter and send back to the RPPA (Retail Pharmacy Prior Authorization) team [[id number]].    If you have more specific questions about our process please reach out to our supervisor Janine Leung.  Thank you!

## 2024-02-02 NOTE — TELEPHONE ENCOUNTER
MEDICATION APPEAL DENIED    Medication: OZEMPIC (2 MG/DOSE) 8 MG/3ML SC SOPN  Insurance Company: [employer]  Denial Date: 1/12/2024  Denial Reason(s): Plan will only cover for type 2 diabetes  Second Level Appeal Information: I will need to call plan for this information, unwilling to provide today  Patient Notified: No, care team must notify on denials   Central Prior Authorization Team ONLY: Second level appeals will be managed by the clinic staff and provider. Please contact the [facility] Prior Authorization Team if additional information about the denial is needed.    Spoke with plan, plan advised that denial was upheld and determination was actually made on 01/12/2024. Requested letter be resent, rep advised they were unable to resend letter.

## 2024-07-03 ENCOUNTER — TELEPHONE (OUTPATIENT)
Dept: FAMILY MEDICINE | Facility: CLINIC | Age: 44
End: 2024-07-03

## 2024-07-03 NOTE — TELEPHONE ENCOUNTER
Patient Quality Outreach    Patient is due for the following:   Depression  -  PHQ-9 needed and Depression follow-up visit    Next Steps:   Schedule a office visit for Depression     Type of outreach:    Sent [employer] message.    Next Steps:  Reach out within 90 days via [employer]    Max number of attempts reached: No. Will try again in 90 days if patient still on fail list.    Questions for provider review:    None           Georgia Rutherford MA  Chart routed to Care Team.

## 2024-12-27 ENCOUNTER — TELEPHONE (OUTPATIENT)
Dept: FAMILY MEDICINE | Facility: CLINIC | Age: 44
End: 2024-12-27

## 2025-01-05 ENCOUNTER — HEALTH MAINTENANCE LETTER (OUTPATIENT)
Age: 45
End: 2025-01-05

## 2025-08-06 ENCOUNTER — PATIENT OUTREACH (OUTPATIENT)
Dept: CARE COORDINATION | Facility: CLINIC | Age: 45
End: 2025-08-06
Payer: COMMERCIAL